# Patient Record
Sex: FEMALE | Race: OTHER | Employment: OTHER | URBAN - METROPOLITAN AREA
[De-identification: names, ages, dates, MRNs, and addresses within clinical notes are randomized per-mention and may not be internally consistent; named-entity substitution may affect disease eponyms.]

---

## 2020-05-30 ENCOUNTER — HOSPITAL ENCOUNTER (EMERGENCY)
Facility: HOSPITAL | Age: 80
Discharge: HOME | End: 2020-05-30
Attending: EMERGENCY MEDICINE
Payer: MEDICAID

## 2020-05-30 ENCOUNTER — APPOINTMENT (OUTPATIENT)
Dept: GENERAL RADIOLOGY | Facility: HOSPITAL | Age: 80
End: 2020-05-30
Attending: EMERGENCY MEDICINE
Payer: MEDICAID

## 2020-05-30 VITALS
SYSTOLIC BLOOD PRESSURE: 141 MMHG | RESPIRATION RATE: 24 BRPM | TEMPERATURE: 103 F | HEIGHT: 60 IN | DIASTOLIC BLOOD PRESSURE: 58 MMHG | WEIGHT: 170 LBS | OXYGEN SATURATION: 98 % | BODY MASS INDEX: 33.38 KG/M2 | HEART RATE: 65 BPM

## 2020-05-30 DIAGNOSIS — N30.00 ACUTE CYSTITIS WITHOUT HEMATURIA: Primary | ICD-10-CM

## 2020-05-30 DIAGNOSIS — R53.1 WEAKNESS: ICD-10-CM

## 2020-05-30 PROCEDURE — 96365 THER/PROPH/DIAG IV INF INIT: CPT

## 2020-05-30 PROCEDURE — 99285 EMERGENCY DEPT VISIT HI MDM: CPT

## 2020-05-30 PROCEDURE — 87040 BLOOD CULTURE FOR BACTERIA: CPT | Performed by: EMERGENCY MEDICINE

## 2020-05-30 PROCEDURE — 87086 URINE CULTURE/COLONY COUNT: CPT | Performed by: EMERGENCY MEDICINE

## 2020-05-30 PROCEDURE — 87077 CULTURE AEROBIC IDENTIFY: CPT | Performed by: EMERGENCY MEDICINE

## 2020-05-30 PROCEDURE — 87150 DNA/RNA AMPLIFIED PROBE: CPT | Performed by: EMERGENCY MEDICINE

## 2020-05-30 PROCEDURE — 87186 SC STD MICRODIL/AGAR DIL: CPT | Performed by: EMERGENCY MEDICINE

## 2020-05-30 PROCEDURE — 93005 ELECTROCARDIOGRAM TRACING: CPT

## 2020-05-30 PROCEDURE — 93010 ELECTROCARDIOGRAM REPORT: CPT

## 2020-05-30 PROCEDURE — 36415 COLL VENOUS BLD VENIPUNCTURE: CPT

## 2020-05-30 PROCEDURE — 83605 ASSAY OF LACTIC ACID: CPT | Performed by: EMERGENCY MEDICINE

## 2020-05-30 PROCEDURE — 85025 COMPLETE CBC W/AUTO DIFF WBC: CPT | Performed by: EMERGENCY MEDICINE

## 2020-05-30 PROCEDURE — 80053 COMPREHEN METABOLIC PANEL: CPT | Performed by: EMERGENCY MEDICINE

## 2020-05-30 PROCEDURE — 96361 HYDRATE IV INFUSION ADD-ON: CPT

## 2020-05-30 PROCEDURE — 81001 URINALYSIS AUTO W/SCOPE: CPT | Performed by: EMERGENCY MEDICINE

## 2020-05-30 PROCEDURE — 71045 X-RAY EXAM CHEST 1 VIEW: CPT | Performed by: EMERGENCY MEDICINE

## 2020-05-30 RX ORDER — ACETAMINOPHEN 500 MG
1000 TABLET ORAL ONCE
Status: COMPLETED | OUTPATIENT
Start: 2020-05-30 | End: 2020-05-30

## 2020-05-30 RX ORDER — SODIUM CHLORIDE 9 MG/ML
INJECTION, SOLUTION INTRAVENOUS CONTINUOUS
Status: DISCONTINUED | OUTPATIENT
Start: 2020-05-30 | End: 2020-05-31

## 2020-05-30 RX ORDER — AMOXICILLIN 500 MG/1
500 TABLET, FILM COATED ORAL 2 TIMES DAILY
Qty: 14 TABLET | Refills: 0 | Status: ON HOLD | OUTPATIENT
Start: 2020-05-30 | End: 2020-06-02

## 2020-05-30 NOTE — ED INITIAL ASSESSMENT (HPI)
Per medic patient having fever, dizzy, and weak for the last couple of days. Pt and family are Yakut speaking.  PMH: HTN, hypothyroid

## 2020-05-30 NOTE — ED PROVIDER NOTES
Patient Seen in: [facility] Emergency Department      History   Patient presents with:  Fever    Stated Complaint: fever    HPI    Patient is an 79-year-old female who presents via EMS for diffuse weakness, fever.   Patient primarily speaks [place] b normal.      Pupils: Pupils are equal, round, and reactive to light. Neck:      Musculoskeletal: Normal range of motion and neck supple. Cardiovascular:      Rate and Rhythm: Normal rate and regular rhythm. Heart sounds: Normal heart sounds.    Pul -----------         ------                     CBC W/ DIFFERENTIAL[[id number]]          Abnormal            Final result                 Please view results for these tests on the individual orders.    BLOOD CULTURE   BLOOD CULTURE   URI have any old labs for comparison although the daughter states the patient has some kidney problems. Her urine is consistent with UTI with some white cells, bacteria, leukocyte esterase. Rocephin has been ordered.   Also has been discussed with patient and

## 2020-05-31 ENCOUNTER — HOSPITAL ENCOUNTER (INPATIENT)
Facility: HOSPITAL | Age: 80
LOS: 5 days | Discharge: HOME | DRG: 690 | End: 2020-06-05
Attending: EMERGENCY MEDICINE | Admitting: HOSPITALIST
Payer: MEDICAID

## 2020-05-31 DIAGNOSIS — R78.81 E COLI BACTEREMIA: Primary | ICD-10-CM

## 2020-05-31 DIAGNOSIS — B96.20 E COLI BACTEREMIA: Primary | ICD-10-CM

## 2020-05-31 PROBLEM — D64.9 ANEMIA: Status: ACTIVE | Noted: 2020-05-31

## 2020-05-31 PROBLEM — R73.9 HYPERGLYCEMIA: Status: ACTIVE | Noted: 2020-05-31

## 2020-05-31 PROCEDURE — 99223 1ST HOSP IP/OBS HIGH 75: CPT | Performed by: HOSPITALIST

## 2020-05-31 RX ORDER — LATANOPROST 50 UG/ML
1 SOLUTION/ DROPS OPHTHALMIC NIGHTLY
COMMUNITY

## 2020-05-31 RX ORDER — LEVOTHYROXINE SODIUM 0.07 MG/1
75 TABLET ORAL
COMMUNITY

## 2020-05-31 RX ORDER — SPIRONOLACTONE 25 MG/1
25 TABLET ORAL DAILY
COMMUNITY

## 2020-05-31 RX ORDER — GABAPENTIN 100 MG/1
200 CAPSULE ORAL EVERY MORNING
Status: DISCONTINUED | OUTPATIENT
Start: 2020-06-01 | End: 2020-06-05

## 2020-05-31 RX ORDER — LEVOTHYROXINE SODIUM 0.07 MG/1
75 TABLET ORAL
Status: DISCONTINUED | OUTPATIENT
Start: 2020-06-01 | End: 2020-06-05

## 2020-05-31 RX ORDER — SODIUM CHLORIDE 9 MG/ML
INJECTION, SOLUTION INTRAVENOUS CONTINUOUS
Status: DISCONTINUED | OUTPATIENT
Start: 2020-05-31 | End: 2020-06-04

## 2020-05-31 RX ORDER — METOCLOPRAMIDE HYDROCHLORIDE 5 MG/ML
5 INJECTION INTRAMUSCULAR; INTRAVENOUS EVERY 8 HOURS PRN
Status: DISCONTINUED | OUTPATIENT
Start: 2020-05-31 | End: 2020-06-05

## 2020-05-31 RX ORDER — HEPARIN SODIUM 5000 [USP'U]/ML
5000 INJECTION, SOLUTION INTRAVENOUS; SUBCUTANEOUS EVERY 8 HOURS SCHEDULED
Status: DISCONTINUED | OUTPATIENT
Start: 2020-05-31 | End: 2020-06-05

## 2020-05-31 RX ORDER — LATANOPROST 50 UG/ML
1 SOLUTION/ DROPS OPHTHALMIC NIGHTLY
Status: DISCONTINUED | OUTPATIENT
Start: 2020-05-31 | End: 2020-06-05

## 2020-05-31 RX ORDER — GABAPENTIN 300 MG/1
300 CAPSULE ORAL EVERY EVENING
Status: DISCONTINUED | OUTPATIENT
Start: 2020-05-31 | End: 2020-06-05

## 2020-05-31 RX ORDER — HYDROCODONE BITARTRATE AND ACETAMINOPHEN 5; 325 MG/1; MG/1
1 TABLET ORAL 3 TIMES DAILY PRN
COMMUNITY

## 2020-05-31 RX ORDER — ATORVASTATIN CALCIUM 10 MG/1
10 TABLET, FILM COATED ORAL DAILY
COMMUNITY

## 2020-05-31 RX ORDER — ACETAMINOPHEN 325 MG/1
650 TABLET ORAL EVERY 6 HOURS PRN
Status: DISCONTINUED | OUTPATIENT
Start: 2020-05-31 | End: 2020-06-05

## 2020-05-31 RX ORDER — SERTRALINE HYDROCHLORIDE 25 MG/1
25 TABLET, FILM COATED ORAL NIGHTLY
Status: DISCONTINUED | OUTPATIENT
Start: 2020-06-01 | End: 2020-06-05

## 2020-05-31 RX ORDER — ONDANSETRON 2 MG/ML
4 INJECTION INTRAMUSCULAR; INTRAVENOUS EVERY 6 HOURS PRN
Status: DISCONTINUED | OUTPATIENT
Start: 2020-05-31 | End: 2020-06-05

## 2020-05-31 RX ORDER — FUROSEMIDE 40 MG/1
40 TABLET ORAL DAILY
Status: ON HOLD | COMMUNITY
End: 2020-06-02

## 2020-05-31 RX ORDER — ATORVASTATIN CALCIUM 10 MG/1
10 TABLET, FILM COATED ORAL DAILY
Status: DISCONTINUED | OUTPATIENT
Start: 2020-05-31 | End: 2020-06-05

## 2020-05-31 RX ORDER — METOPROLOL SUCCINATE 100 MG/1
100 TABLET, EXTENDED RELEASE ORAL DAILY
Status: DISCONTINUED | OUTPATIENT
Start: 2020-06-01 | End: 2020-06-05

## 2020-05-31 RX ORDER — METOPROLOL SUCCINATE 100 MG/1
100 TABLET, EXTENDED RELEASE ORAL DAILY
COMMUNITY

## 2020-05-31 RX ORDER — SODIUM CHLORIDE 9 MG/ML
INJECTION, SOLUTION INTRAVENOUS CONTINUOUS
Status: ACTIVE | OUTPATIENT
Start: 2020-05-31 | End: 2020-05-31

## 2020-05-31 RX ORDER — SERTRALINE HYDROCHLORIDE 25 MG/1
25 TABLET, FILM COATED ORAL NIGHTLY
COMMUNITY

## 2020-05-31 RX ORDER — GABAPENTIN 100 MG/1
200 CAPSULE ORAL EVERY MORNING
COMMUNITY

## 2020-05-31 RX ORDER — GABAPENTIN 100 MG/1
300 CAPSULE ORAL EVERY EVENING
COMMUNITY

## 2020-05-31 NOTE — ED PROVIDER NOTES
Patient Seen in: [facility] Emergency Department      History   Patient presents with:  Abnormal Result    Stated Complaint: + blood cultures    HPI    79year-old with history of hypertension, high cholesterol, hypothyroidism, chronic back pain pre rate and rhythm, normal S1-S2. Respiratory: Lungs are clear to auscultation bilaterally. Abdomen: Soft, nontender, nondistended. Back: No CVA tenderness. Extremities: No edema.   Skin: Warm and mildly diaphoretic  ED Course     Labs Reviewed   BASIC M HCT  35.0 - 48.0 % 35.9    PLT  150.0 - 450.0 10(3)uL 153.0    MCV  80.0 - 100.0 fL 97.6    MCH  26.0 - 34.0 pg 31.3    MCHC  31.0 - 37.0 g/dL 32.0    RDW  11.0 - 15.0 % 13.8    RDW-SD  35.1 - 46.3 fL 49.5High     Neutrophil Absolute Prelim  1.50 - 7.70 disposition: 5/31/2020 12:44 PM         Spoke with Dr. Jose Raul Curiel who will admit her.           Disposition and Plan     Clinical Impression:  E coli bacteremia  (primary encounter diagnosis)    Disposition:  Admit  5/31/2020 12:44 pm    Follow-up:  No follow-u

## 2020-05-31 NOTE — H&P
[facility]PITAL[facility]  History and Physical     Marianela Casarez Patient Status:  Emergency    3/26/1940 MRN [id number]   Location [address] Attending Almaz Cotton MD   [facility] Day # 0 PCP Rojelio Campbell DC, FNP-BC Metoprolol Succinate  MG Oral Tablet 24 Hr, Take 100 mg by mouth daily. , Disp: , Rfl:   spironolactone 25 MG Oral Tab, Take 25 mg by mouth daily. , Disp: , Rfl:   Sertraline HCl 25 MG Oral Tab, Take 25 mg by mouth daily. , Disp: , Rfl:   latanoprost 7.1  --        Estimated Creatinine Clearance: 13.3 mL/min (A) (based on SCr of 2.39 mg/dL (H)). No results for input(s): PTP, INR in the last 168 hours. No results for input(s): TROP, CK in the last 168 hours.     Imaging: Imaging data reviewed in Ep

## 2020-05-31 NOTE — PROGRESS NOTES
NURSING ADMISSION NOTE      Patient admitted via Cart  Oriented to room. Safety precautions initiated. Bed in low position. Call light in reach. Patient afebrile,  denies discomfort.

## 2020-06-01 ENCOUNTER — APPOINTMENT (OUTPATIENT)
Dept: ULTRASOUND IMAGING | Facility: HOSPITAL | Age: 80
DRG: 690 | End: 2020-06-01
Attending: HOSPITALIST
Payer: MEDICAID

## 2020-06-01 PROCEDURE — 76775 US EXAM ABDO BACK WALL LIM: CPT | Performed by: HOSPITALIST

## 2020-06-01 PROCEDURE — 99232 SBSQ HOSP IP/OBS MODERATE 35: CPT | Performed by: HOSPITALIST

## 2020-06-01 NOTE — PROGRESS NOTES
[facility] Pharmacy Note:  Renal Adjustment for piperacillin/tazobactam ([place])    Servando Cedeno is a 79year old female who has been prescribed piperacillin/tazobactam (ZOSYN) 3.375 g every 8 hrs.   CrCl is estimated creatinine clearance is 14.9 mL/min (A) (

## 2020-06-01 NOTE — PROGRESS NOTES
[facility]ITAL[facility]  Progress Note     Allynha Locus Patient Status:  Inpatient    3/26/1940 MRN [id number]   [facility] 4NW-A Attending Jesse Bell MD   [address] Day # 1 PCP Jena José DC, FNP-BC     Chief Complaint: Loral Bilis Epic.    Medications:   • piperacillin-tazobactam  3.375 g Intravenous Q12H   • Heparin Sodium (Porcine)  5,000 Units Subcutaneous Q8H [facility] & longterm   • gabapentin  200 mg Oral QAM   • gabapentin  300 mg Oral QPM   • atorvastatin  10 mg Oral Daily   • latanoprost  1

## 2020-06-01 NOTE — PLAN OF CARE
Assumed care of pt at [address]. Pt alert. Oriented to person only. Primarily Kenyan speaking. Vitals stable. No c/o pain. On IV fluids and IV abx. Pt pulled out IV. Multiple different RN attempts unsuccessful. ICU nurse going to attempt IV insertion.  Bed [facility] to maximize function  - Promote sitting position while performing ADLs such as feeding, grooming, and bathing  - Educate and encourage patient/family in tolerated functional activity level and precautions during self-care  Outcome: Progressing     Problem:

## 2020-06-01 NOTE — PLAN OF CARE
Pt is aox3 and [place] speaking only. Pt has denied any sob at this time, and any chest pain. Pt bowel sound are present, and pt denies any Nausea. Pt vitals has been stable and pt has been afebrile during this shift.  Pt has now questions at this time and Impaired Activities of Daily Living  Goal: Achieve highest/safest level of independence in self care  Description  Interventions:  - Assess ability and encourage patient to participate in ADLs to maximize function  - Promote sitting position while performi

## 2020-06-02 PROCEDURE — 99232 SBSQ HOSP IP/OBS MODERATE 35: CPT | Performed by: HOSPITALIST

## 2020-06-02 RX ORDER — CIPROFLOXACIN 500 MG/1
500 TABLET, FILM COATED ORAL DAILY
Qty: 10 TABLET | Refills: 0 | Status: SHIPPED | OUTPATIENT
Start: 2020-06-02 | End: 2020-06-05

## 2020-06-02 RX ORDER — CIPROFLOXACIN 500 MG/1
500 TABLET, FILM COATED ORAL 2 TIMES DAILY
Qty: 20 TABLET | Refills: 0 | Status: SHIPPED | OUTPATIENT
Start: 2020-06-02 | End: 2020-06-02

## 2020-06-02 RX ORDER — LOPERAMIDE HYDROCHLORIDE 2 MG/1
2 CAPSULE ORAL 4 TIMES DAILY PRN
Status: DISCONTINUED | OUTPATIENT
Start: 2020-06-02 | End: 2020-06-05

## 2020-06-02 RX ORDER — POTASSIUM CHLORIDE 14.9 MG/ML
20 INJECTION INTRAVENOUS ONCE
Status: COMPLETED | OUTPATIENT
Start: 2020-06-02 | End: 2020-06-02

## 2020-06-02 NOTE — PROGRESS NOTES
Pt is alert and oriented and speaks Mexican. Pt has temp of 100.1 and has ivf running.  Pt has no needs at this time

## 2020-06-02 NOTE — PAYOR COMM NOTE
--------------  ADMISSION REVIEW     Payor: Sal Mathew #:  [id number]  Authorization Number: [id number]    Admit date: 5/31/20  Admit time: 1518       Admitting Physician: Ewa Blackmon MD  Attending Physician:  Ewa Blackmon MD  Primary Care y HPI.  Constitutional and vital signs reviewed. All other systems reviewed and negative except as noted above.     Physical Exam     ED Triage Vitals [05/31/20 1147]   /48   Pulse 60   Resp 17   Temp 97.7 °F (36.5 °C)   Temp src Oral   SpO2 99 % 35High     Creatinine  0.55 - 1.02 mg/dL 2. 28High     BUN/CREA Ratio  10.0 - 20.0 15.4    Calcium, Total  8.5 - 10.1 mg/dL 8.1Low     Calculated Osmolality  275 - 295 mOsm/kg 292    GFR, Non-  >=60 19Low     GFR, -American  >=60 22Lo 3-5Abnormal     Bacteria Urine  None Seen 3+Abnormal     Squamous Epi.  Cells  Small /LPF None Seen    Renal Tubular Epithelial Cells  Small /LPF None Seen    Transitional Cells  Small /LPF None Seen    Hyaline Casts  None Seen /LPF PresentAbnormal     Muco fever.  Pt was seen in the ED yesterday and diagnosed with UTI. She felt better and wanted to go home.   She was d/c home in good condition, however, today she was notified her blood cultures were positive and asked to come back to be admitted for IV antib by mouth every evening., Disp: , Rfl:         Review of Systems:   A comprehensive 14 point review of systems was completed. Pertinent positives and negatives noted in the HPI.     Physical Exam:    /48   Pulse 59   Temp 97.7 °F (36.5 °C) (Oral) Gabapentin    Quality:  · DVT Prophylaxis: heparin  · CODE status: Full  · Whyte: no    Plan of care discussed with pt and RN.     Rickey Dakin, MD  5/31/2020                        Electronically signed by Ricky Aggarwal MD on 5/31/2020  3:59 PM         ME (ZOLOFT) tab 25 mg     Date Action Dose Route User    6/1/2020 2021 Given 25 mg Oral Sydnie Edge, RN          6/1 HOSPITALIST NOTE  Chief Complaint: bacteremia     S: Patient febrile overnight, denies abdominal pain, nausea, vomiting, back from ult Gabapentin  8.  CHEY on CKD, Cr improved     Plan of care: cont IV abx, f/u final sensitivities     Quality:  · DVT Prophylaxis: heparin  · CODE status: Full  · Whyte: no  · Central line: no     Will the patient be referred to TCC on discharge?: no  Estimate

## 2020-06-02 NOTE — PLAN OF CARE
Problem: METABOLIC/FLUID AND ELECTROLYTES - ADULT  Goal: Electrolytes maintained within normal limits  Description  INTERVENTIONS:  - Monitor labs and rhythm and assess patient for signs and symptoms of electrolyte imbalances  - Administer electrolyte re Does have hx of neuropathy. Receiving gabapentin as ordered. Pt. W/ temp 99 this late am. Did note to be somewhat diaphoretic prior to checking temp. Denies chills. Pt. Receiving Zosyn for Ecoli Bacteremia. Call received from pt's dtr Noemí Lee.  Update

## 2020-06-02 NOTE — PROGRESS NOTES
[facility]  Progress Note     Rob Gomez Patient Status:  Inpatient    3/26/1940 MRN [id number]   [facility] 4NW-A Attending Fernando Dove MD   [facility] Day # 2 PCP Yuliana Valiente DC, FNP-BC     Chief Complaint: Gabi Malcolm 168 hours. No results for input(s): TROP, CK in the last 168 hours. Imaging: Imaging data reviewed in Epic.     Medications:   • potassium chloride  20 mEq Intravenous Once   • piperacillin-tazobactam  3.375 g Intravenous Q12H   • Heparin Sodium

## 2020-06-03 PROCEDURE — 99232 SBSQ HOSP IP/OBS MODERATE 35: CPT | Performed by: HOSPITALIST

## 2020-06-03 RX ORDER — POTASSIUM CHLORIDE 20 MEQ/1
40 TABLET, EXTENDED RELEASE ORAL EVERY 4 HOURS
Status: COMPLETED | OUTPATIENT
Start: 2020-06-03 | End: 2020-06-03

## 2020-06-03 NOTE — PLAN OF CARE
Pt Aox4. [place] Islander speaking. RA. Pt had temp of 100.1 during shift. Tylenol given. Temp went down to 99. Pt having loose stools. Imodium given per MAR. IVF infusing. Fall precautions in place. Call light in reach. Will continue to monitor. care  Description  Interventions:  - Assess ability and encourage patient to participate in ADLs to maximize function  - Promote sitting position while performing ADLs such as feeding, grooming, and bathing  - Educate and encourage patient/family in [place]

## 2020-06-03 NOTE — PROGRESS NOTES
[facility] Pharmacy Note:  Renal Adjustment for Cefazolin     Indu Born is a 79year old female who has been prescribed Cefazolin 1000 mg every 8 hrs. CrCl is estimated creatinine clearance is 16.8 mL/min (A) (based on SCr of 1.89 mg/dL (H)).  so the [facility]

## 2020-06-03 NOTE — PLAN OF CARE
Patient alert and oriented x4. Tajik speaking.  line utilized. On Ra. Denies pain. IVF. IV abx. Afebrile. Diarrhea. cdiff negative. PRN immodium. Son updated on POC. Resting comfortably in bed. Will continue to monitor.     Dc'd IV

## 2020-06-03 NOTE — PROGRESS NOTES
[facility]  Progress Note     Mark Alex Patient Status:  Inpatient    3/26/1940 MRN [id number]   [facility] 4NW-A Attending Jim Adan MD   [facility] Day # 3 PCP Ronda Urrutia DC, FNP-BC     Chief Complaint: Valerie Hunt hours. No results for input(s): TROP, CK in the last 168 hours. Imaging: Imaging data reviewed in Epic.     Medications:   • Potassium Chloride ER  40 mEq Oral Q4H   • piperacillin-tazobactam  3.375 g Intravenous Q12H   • Heparin Sodium (Porcine)

## 2020-06-04 ENCOUNTER — APPOINTMENT (OUTPATIENT)
Dept: GENERAL RADIOLOGY | Facility: HOSPITAL | Age: 80
DRG: 690 | End: 2020-06-04
Attending: HOSPITALIST
Payer: MEDICAID

## 2020-06-04 PROCEDURE — 99233 SBSQ HOSP IP/OBS HIGH 50: CPT | Performed by: HOSPITALIST

## 2020-06-04 PROCEDURE — 71045 X-RAY EXAM CHEST 1 VIEW: CPT | Performed by: HOSPITALIST

## 2020-06-04 RX ORDER — FUROSEMIDE 10 MG/ML
20 INJECTION INTRAMUSCULAR; INTRAVENOUS ONCE
Status: COMPLETED | OUTPATIENT
Start: 2020-06-04 | End: 2020-06-04

## 2020-06-04 RX ORDER — IPRATROPIUM BROMIDE AND ALBUTEROL SULFATE 2.5; .5 MG/3ML; MG/3ML
3 SOLUTION RESPIRATORY (INHALATION) EVERY 6 HOURS PRN
Status: DISCONTINUED | OUTPATIENT
Start: 2020-06-04 | End: 2020-06-05

## 2020-06-04 NOTE — PROGRESS NOTES
[facility]  Progress Note     Salo Walton Patient Status:  Inpatient    3/26/1940 MRN [id number]   [facility] 4NW-A Attending Awais Plummer MD   [facility] Day # 4 PCP Rojelio Campbell DC, FNP-BC     Chief Complaint: abd pain --   --   --    TP 7.1  --   --   --   --   --   --     < > = values in this interval not displayed. Estimated Creatinine Clearance: 19.7 mL/min (A) (based on SCr of 1.61 mg/dL (H)). No results for input(s): PTP, INR in the last 168 hours.     No

## 2020-06-04 NOTE — PLAN OF CARE
Assumed care of pt at  Pt A/O x4. Vitals stable. Afebrile. No c/o pain. Pashto speaking- using  to communicate. IV fluids infusing per MAR. Ambulatory to bathroom. Bed alarm on. Call light within reach. Will continue to monitor.    0500: Pt sampson Progressing     Problem: Impaired Activities of Daily Living  Goal: Achieve highest/safest level of independence in self care  Description  Interventions:  - Assess ability and encourage patient to participate in ADLs to maximize function  - Promote sittin

## 2020-06-04 NOTE — PLAN OF CARE
Problem: Patient/Family Goals  Goal: Patient/Family Long Term Goal  Description  Patient's Long Term Goal: d/c with adequate resources     Interventions:  - SW/CM assistance   - See additional Care Plan goals for specific interventions   Outcome: [place] Monitor temperature, glucose, and sodium.  Initiate appropriate interventions as ordered  Outcome: Progressing     Problem: Impaired Activities of Daily Living  Goal: Achieve highest/safest level of independence in self care  Description  Interventions:  -

## 2020-06-05 VITALS
RESPIRATION RATE: 18 BRPM | SYSTOLIC BLOOD PRESSURE: 131 MMHG | DIASTOLIC BLOOD PRESSURE: 42 MMHG | HEIGHT: 60 IN | OXYGEN SATURATION: 96 % | HEART RATE: 55 BPM | WEIGHT: 169.31 LBS | BODY MASS INDEX: 33.24 KG/M2 | TEMPERATURE: 98 F

## 2020-06-05 PROCEDURE — 99239 HOSP IP/OBS DSCHRG MGMT >30: CPT | Performed by: HOSPITALIST

## 2020-06-05 RX ORDER — CIPROFLOXACIN 500 MG/1
500 TABLET, FILM COATED ORAL DAILY
Qty: 10 TABLET | Refills: 0 | Status: SHIPPED | OUTPATIENT
Start: 2020-06-05 | End: 2020-06-15

## 2020-06-05 NOTE — PLAN OF CARE
Awake & alert, afebrile & denies having any pain. Fever free since yesterday, appetite is good. Some wheezing & SOB on exertion otherwise she is on RA with O2 sats at 96. On antibiotics for UTI.  MD at bedside & she has just told patient that she can be disc highest/safest level of independence in self care  Description  Interventions:  - Assess ability and encourage patient to participate in ADLs to maximize function  - Promote sitting position while performing ADLs such as feeding, grooming, and bathing  - E

## 2020-06-05 NOTE — PROGRESS NOTES
Problem: Pt A&Ox4 . SS , this RN utilized  services. Afebrile. No c/o loose stools this shift. Up with standby assist. IV abx. IVF d/c'd . CXR today. IV lasix x1 .   Up with x1 assist w/walker Pt and family updated on plan of care and verbalized u

## 2020-06-05 NOTE — PLAN OF CARE
Assumed care of pt at [address] [place] [address]. Pt A/O x4- Korean speaking. Using  service. Pt denies pain or SOB. Afebrile. Ambulatory to bathroom- incontinent at times. Call light within reach. Will continue to monitor.      Problem: METABOLIC/FLUID AND ELECTROLYT ability and encourage patient to participate in ADLs to maximize function  - Promote sitting position while performing ADLs such as feeding, grooming, and bathing  - Educate and encourage patient/family in tolerated functional activity level and precaution

## 2020-06-05 NOTE — DISCHARGE SUMMARY
[facility] [place] HOSPITALIST  DISCHARGE SUMMARY     Suzan Casarez Patient Status:  Inpatient    3/26/1940 MRN [id number]   [facility] 4NW-A Attending Miguelina Barber MD   [facility] Day # 5 PCP Ronaldo Johnson, JOSE CARLOS-BC     Date of Admission:  HYDROcodone-acetaminophen 5-325 MG Tabs  Commonly known as:  NORCO      Take 1 tablet by mouth 3 (three) times daily as needed for Pain. Refills:  0     latanoprost 0.005 % Soln  Commonly known as:  XALATAN      Place 1 drop into both eyes nightly.    R 30 minutes

## 2020-06-06 NOTE — PAYOR COMM NOTE
--------------  DISCHARGE REVIEW    Payor: Kalpesh Mayen #:  [id number]  Authorization Number: [id number]    Admit date: 5/31/20  Admit time:  1518  Discharge Date: 6/5/2020  5:25 PM     Admitting Physician: Cammie Mckenna MD  Attending Physician:  Cheryl rich

## 2022-10-10 ENCOUNTER — APPOINTMENT (OUTPATIENT)
Dept: GENERAL RADIOLOGY | Facility: HOSPITAL | Age: 82
End: 2022-10-10
Payer: MEDICAID

## 2022-10-10 ENCOUNTER — HOSPITAL ENCOUNTER (EMERGENCY)
Facility: HOSPITAL | Age: 82
Discharge: HOME | End: 2022-10-10
Payer: MEDICAID

## 2022-10-10 VITALS
RESPIRATION RATE: 18 BRPM | DIASTOLIC BLOOD PRESSURE: 63 MMHG | HEART RATE: 64 BPM | SYSTOLIC BLOOD PRESSURE: 144 MMHG | TEMPERATURE: 97 F | OXYGEN SATURATION: 96 % | WEIGHT: 164 LBS | BODY MASS INDEX: 32 KG/M2

## 2022-10-10 DIAGNOSIS — S63.501A SPRAIN OF RIGHT WRIST, INITIAL ENCOUNTER: Primary | ICD-10-CM

## 2022-10-10 PROCEDURE — 99283 EMERGENCY DEPT VISIT LOW MDM: CPT

## 2022-10-10 PROCEDURE — 73090 X-RAY EXAM OF FOREARM: CPT

## 2022-10-10 PROCEDURE — 73130 X-RAY EXAM OF HAND: CPT

## 2022-10-11 NOTE — ED INITIAL ASSESSMENT (HPI)
Pt presents to ed c/o left wrist and hand pain at a 9/10 after falling on Friday, pt denies head or neck injury, denies Janice Jacinto.

## 2024-09-12 ENCOUNTER — HOSPITAL ENCOUNTER (INPATIENT)
Facility: HOSPITAL | Age: 84
LOS: 1 days | Discharge: HOME | End: 2024-09-13
Attending: EMERGENCY MEDICINE | Admitting: STUDENT IN AN ORGANIZED HEALTH CARE EDUCATION/TRAINING PROGRAM
Payer: MEDICAID

## 2024-09-12 ENCOUNTER — APPOINTMENT (OUTPATIENT)
Dept: GENERAL RADIOLOGY | Facility: HOSPITAL | Age: 84
End: 2024-09-12
Attending: EMERGENCY MEDICINE
Payer: MEDICAID

## 2024-09-12 ENCOUNTER — APPOINTMENT (OUTPATIENT)
Dept: CT IMAGING | Facility: HOSPITAL | Age: 84
End: 2024-09-12
Attending: EMERGENCY MEDICINE
Payer: MEDICAID

## 2024-09-12 ENCOUNTER — APPOINTMENT (OUTPATIENT)
Dept: MRI IMAGING | Facility: HOSPITAL | Age: 84
End: 2024-09-12
Attending: EMERGENCY MEDICINE
Payer: MEDICAID

## 2024-09-12 ENCOUNTER — TELEPHONE (OUTPATIENT)
Dept: NEUROLOGY | Facility: CLINIC | Age: 84
End: 2024-09-12

## 2024-09-12 DIAGNOSIS — G45.9 BRAIN TIA: Primary | ICD-10-CM

## 2024-09-12 DIAGNOSIS — R79.89 ELEVATED TROPONIN I LEVEL: ICD-10-CM

## 2024-09-12 PROBLEM — E78.5 HYPERLIPIDEMIA: Status: ACTIVE | Noted: 2024-09-12

## 2024-09-12 PROBLEM — E03.9 HYPOTHYROIDISM: Status: ACTIVE | Noted: 2024-09-12

## 2024-09-12 PROBLEM — I10 PRIMARY HYPERTENSION: Status: ACTIVE | Noted: 2024-09-12

## 2024-09-12 LAB
ALBUMIN SERPL-MCNC: 3.9 G/DL (ref 3.2–4.8)
ALBUMIN/GLOB SERPL: 1.3 {RATIO} (ref 1–2)
ALP LIVER SERPL-CCNC: 76 U/L
ALT SERPL-CCNC: 19 U/L
ANION GAP SERPL CALC-SCNC: 6 MMOL/L (ref 0–18)
APTT PPP: 33.1 SECONDS (ref 23–36)
AST SERPL-CCNC: 25 U/L (ref ?–34)
ATRIAL RATE: 56 BPM
BASOPHILS # BLD AUTO: 0.02 X10(3) UL (ref 0–0.2)
BASOPHILS NFR BLD AUTO: 0.4 %
BILIRUB SERPL-MCNC: 0.4 MG/DL (ref 0.2–1.1)
BUN BLD-MCNC: 33 MG/DL (ref 9–23)
CALCIUM BLD-MCNC: 9.3 MG/DL (ref 8.7–10.4)
CHLORIDE SERPL-SCNC: 109 MMOL/L (ref 98–112)
CHOLEST SERPL-MCNC: 132 MG/DL (ref ?–200)
CO2 SERPL-SCNC: 27 MMOL/L (ref 21–32)
CREAT BLD-MCNC: 1.9 MG/DL
EGFRCR SERPLBLD CKD-EPI 2021: 26 ML/MIN/1.73M2 (ref 60–?)
EOSINOPHIL # BLD AUTO: 0.24 X10(3) UL (ref 0–0.7)
EOSINOPHIL NFR BLD AUTO: 5 %
ERYTHROCYTE [DISTWIDTH] IN BLOOD BY AUTOMATED COUNT: 13.2 %
ERYTHROCYTE [SEDIMENTATION RATE] IN BLOOD: 24 MM/HR
EST. AVERAGE GLUCOSE BLD GHB EST-MCNC: 126 MG/DL (ref 68–126)
GLOBULIN PLAS-MCNC: 3 G/DL (ref 2–3.5)
GLUCOSE BLD-MCNC: 232 MG/DL (ref 70–99)
GLUCOSE BLD-MCNC: 78 MG/DL (ref 70–99)
GLUCOSE BLD-MCNC: 83 MG/DL (ref 70–99)
GLUCOSE BLD-MCNC: 87 MG/DL (ref 70–99)
HBA1C MFR BLD: 6 % (ref ?–5.7)
HCT VFR BLD AUTO: 36 %
HDLC SERPL-MCNC: 54 MG/DL (ref 40–59)
HGB BLD-MCNC: 11.8 G/DL
IMM GRANULOCYTES # BLD AUTO: 0.01 X10(3) UL (ref 0–1)
IMM GRANULOCYTES NFR BLD: 0.2 %
INR BLD: 1 (ref 0.8–1.2)
LDLC SERPL CALC-MCNC: 60 MG/DL (ref ?–100)
LYMPHOCYTES # BLD AUTO: 1.67 X10(3) UL (ref 1–4)
LYMPHOCYTES NFR BLD AUTO: 35.1 %
MCH RBC QN AUTO: 32.1 PG (ref 26–34)
MCHC RBC AUTO-ENTMCNC: 32.8 G/DL (ref 31–37)
MCV RBC AUTO: 97.8 FL
MONOCYTES # BLD AUTO: 0.41 X10(3) UL (ref 0.1–1)
MONOCYTES NFR BLD AUTO: 8.6 %
NEUTROPHILS # BLD AUTO: 2.41 X10 (3) UL (ref 1.5–7.7)
NEUTROPHILS # BLD AUTO: 2.41 X10(3) UL (ref 1.5–7.7)
NEUTROPHILS NFR BLD AUTO: 50.7 %
NONHDLC SERPL-MCNC: 78 MG/DL (ref ?–130)
OSMOLALITY SERPL CALC.SUM OF ELEC: 301 MOSM/KG (ref 275–295)
P AXIS: 32 DEGREES
P-R INTERVAL: 138 MS
PLATELET # BLD AUTO: 191 10(3)UL (ref 150–450)
POTASSIUM SERPL-SCNC: 4.5 MMOL/L (ref 3.5–5.1)
PROCALCITONIN SERPL-MCNC: 0.06 NG/ML (ref ?–0.05)
PROT SERPL-MCNC: 6.9 G/DL (ref 5.7–8.2)
PROTHROMBIN TIME: 13.2 SECONDS (ref 11.6–14.8)
Q-T INTERVAL: 430 MS
QRS DURATION: 76 MS
QTC CALCULATION (BEZET): 414 MS
R AXIS: 12 DEGREES
RBC # BLD AUTO: 3.68 X10(6)UL
SODIUM SERPL-SCNC: 142 MMOL/L (ref 136–145)
T AXIS: 16 DEGREES
TRIGL SERPL-MCNC: 93 MG/DL (ref 30–149)
TROPONIN I SERPL HS-MCNC: 59 NG/L
TROPONIN I SERPL HS-MCNC: 62 NG/L
VENTRICULAR RATE: 56 BPM
VLDLC SERPL CALC-MCNC: 14 MG/DL (ref 0–30)
WBC # BLD AUTO: 4.8 X10(3) UL (ref 4–11)

## 2024-09-12 PROCEDURE — 71045 X-RAY EXAM CHEST 1 VIEW: CPT | Performed by: EMERGENCY MEDICINE

## 2024-09-12 PROCEDURE — 99223 1ST HOSP IP/OBS HIGH 75: CPT | Performed by: STUDENT IN AN ORGANIZED HEALTH CARE EDUCATION/TRAINING PROGRAM

## 2024-09-12 PROCEDURE — 70450 CT HEAD/BRAIN W/O DYE: CPT | Performed by: EMERGENCY MEDICINE

## 2024-09-12 RX ORDER — METOCLOPRAMIDE HYDROCHLORIDE 5 MG/ML
5 INJECTION INTRAMUSCULAR; INTRAVENOUS EVERY 8 HOURS PRN
Status: DISCONTINUED | OUTPATIENT
Start: 2024-09-12 | End: 2024-09-12

## 2024-09-12 RX ORDER — ACETAMINOPHEN 500 MG
1000 TABLET ORAL EVERY 8 HOURS PRN
Status: DISCONTINUED | OUTPATIENT
Start: 2024-09-12 | End: 2024-09-13

## 2024-09-12 RX ORDER — ONDANSETRON 2 MG/ML
4 INJECTION INTRAMUSCULAR; INTRAVENOUS EVERY 6 HOURS PRN
Status: DISCONTINUED | OUTPATIENT
Start: 2024-09-12 | End: 2024-09-13

## 2024-09-12 RX ORDER — BISACODYL 10 MG
10 SUPPOSITORY, RECTAL RECTAL
Status: DISCONTINUED | OUTPATIENT
Start: 2024-09-12 | End: 2024-09-13

## 2024-09-12 RX ORDER — LOSARTAN POTASSIUM 25 MG/1
25 TABLET ORAL DAILY
COMMUNITY

## 2024-09-12 RX ORDER — DULOXETIN HYDROCHLORIDE 60 MG/1
60 CAPSULE, DELAYED RELEASE ORAL DAILY
COMMUNITY

## 2024-09-12 RX ORDER — ACETAMINOPHEN 325 MG/1
650 TABLET ORAL EVERY 4 HOURS PRN
Status: DISCONTINUED | OUTPATIENT
Start: 2024-09-12 | End: 2024-09-13

## 2024-09-12 RX ORDER — SENNOSIDES 8.6 MG
17.2 TABLET ORAL NIGHTLY PRN
Status: DISCONTINUED | OUTPATIENT
Start: 2024-09-12 | End: 2024-09-13

## 2024-09-12 RX ORDER — GABAPENTIN 300 MG/1
300 CAPSULE ORAL 3 TIMES DAILY
Status: DISCONTINUED | OUTPATIENT
Start: 2024-09-12 | End: 2024-09-13

## 2024-09-12 RX ORDER — ACETAMINOPHEN 650 MG/1
650 SUPPOSITORY RECTAL EVERY 4 HOURS PRN
Status: DISCONTINUED | OUTPATIENT
Start: 2024-09-12 | End: 2024-09-13

## 2024-09-12 RX ORDER — ENOXAPARIN SODIUM 100 MG/ML
30 INJECTION SUBCUTANEOUS DAILY
Status: DISCONTINUED | OUTPATIENT
Start: 2024-09-12 | End: 2024-09-13

## 2024-09-12 RX ORDER — SODIUM CHLORIDE 9 MG/ML
INJECTION, SOLUTION INTRAVENOUS CONTINUOUS
Status: DISCONTINUED | OUTPATIENT
Start: 2024-09-12 | End: 2024-09-13

## 2024-09-12 RX ORDER — BENZONATATE 100 MG/1
200 CAPSULE ORAL 3 TIMES DAILY PRN
Status: DISCONTINUED | OUTPATIENT
Start: 2024-09-12 | End: 2024-09-13

## 2024-09-12 RX ORDER — ONDANSETRON 2 MG/ML
4 INJECTION INTRAMUSCULAR; INTRAVENOUS EVERY 6 HOURS PRN
Status: DISCONTINUED | OUTPATIENT
Start: 2024-09-12 | End: 2024-09-12

## 2024-09-12 RX ORDER — LOSARTAN POTASSIUM 25 MG/1
25 TABLET ORAL DAILY
Status: DISCONTINUED | OUTPATIENT
Start: 2024-09-12 | End: 2024-09-12

## 2024-09-12 RX ORDER — ASPIRIN 81 MG/1
324 TABLET, CHEWABLE ORAL ONCE
Status: COMPLETED | OUTPATIENT
Start: 2024-09-12 | End: 2024-09-12

## 2024-09-12 RX ORDER — SODIUM CHLORIDE, SODIUM LACTATE, POTASSIUM CHLORIDE, CALCIUM CHLORIDE 600; 310; 30; 20 MG/100ML; MG/100ML; MG/100ML; MG/100ML
INJECTION, SOLUTION INTRAVENOUS CONTINUOUS
Status: DISCONTINUED | OUTPATIENT
Start: 2024-09-12 | End: 2024-09-12

## 2024-09-12 RX ORDER — HYDRALAZINE HYDROCHLORIDE 20 MG/ML
10 INJECTION INTRAMUSCULAR; INTRAVENOUS EVERY 2 HOUR PRN
Status: DISCONTINUED | OUTPATIENT
Start: 2024-09-12 | End: 2024-09-13

## 2024-09-12 RX ORDER — ONDANSETRON 2 MG/ML
4 INJECTION INTRAMUSCULAR; INTRAVENOUS EVERY 4 HOURS PRN
Status: DISCONTINUED | OUTPATIENT
Start: 2024-09-12 | End: 2024-09-12 | Stop reason: ALTCHOICE

## 2024-09-12 RX ORDER — METOCLOPRAMIDE HYDROCHLORIDE 5 MG/ML
5 INJECTION INTRAMUSCULAR; INTRAVENOUS EVERY 8 HOURS PRN
Status: DISCONTINUED | OUTPATIENT
Start: 2024-09-12 | End: 2024-09-13

## 2024-09-12 RX ORDER — ASPIRIN 300 MG/1
300 SUPPOSITORY RECTAL DAILY
Status: DISCONTINUED | OUTPATIENT
Start: 2024-09-13 | End: 2024-09-13

## 2024-09-12 RX ORDER — ONDANSETRON 2 MG/ML
4 INJECTION INTRAMUSCULAR; INTRAVENOUS ONCE
Status: COMPLETED | OUTPATIENT
Start: 2024-09-12 | End: 2024-09-12

## 2024-09-12 RX ORDER — ATORVASTATIN CALCIUM 40 MG/1
40 TABLET, FILM COATED ORAL NIGHTLY
Status: DISCONTINUED | OUTPATIENT
Start: 2024-09-12 | End: 2024-09-12

## 2024-09-12 RX ORDER — METOPROLOL SUCCINATE 50 MG/1
50 TABLET, EXTENDED RELEASE ORAL DAILY
Status: DISCONTINUED | OUTPATIENT
Start: 2024-09-12 | End: 2024-09-12

## 2024-09-12 RX ORDER — ECHINACEA PURPUREA EXTRACT 125 MG
1 TABLET ORAL
Status: DISCONTINUED | OUTPATIENT
Start: 2024-09-12 | End: 2024-09-13

## 2024-09-12 RX ORDER — TORSEMIDE 5 MG/1
10 TABLET ORAL DAILY
Status: DISCONTINUED | OUTPATIENT
Start: 2024-09-12 | End: 2024-09-12

## 2024-09-12 RX ORDER — TORSEMIDE 10 MG/1
10 TABLET ORAL DAILY
COMMUNITY

## 2024-09-12 RX ORDER — ASPIRIN 325 MG
325 TABLET ORAL DAILY
Status: DISCONTINUED | OUTPATIENT
Start: 2024-09-13 | End: 2024-09-13

## 2024-09-12 RX ORDER — METOPROLOL SUCCINATE 50 MG/1
50 TABLET, EXTENDED RELEASE ORAL DAILY
Status: DISCONTINUED | OUTPATIENT
Start: 2024-09-13 | End: 2024-09-13

## 2024-09-12 RX ORDER — DULOXETIN HYDROCHLORIDE 30 MG/1
60 CAPSULE, DELAYED RELEASE ORAL DAILY
Status: DISCONTINUED | OUTPATIENT
Start: 2024-09-12 | End: 2024-09-13

## 2024-09-12 RX ORDER — LEVOTHYROXINE SODIUM 100 UG/1
100 TABLET ORAL
Status: DISCONTINUED | OUTPATIENT
Start: 2024-09-13 | End: 2024-09-13

## 2024-09-12 RX ORDER — TORSEMIDE 5 MG/1
10 TABLET ORAL DAILY
Status: DISCONTINUED | OUTPATIENT
Start: 2024-09-13 | End: 2024-09-13

## 2024-09-12 RX ORDER — POLYETHYLENE GLYCOL 3350 17 G/17G
17 POWDER, FOR SOLUTION ORAL DAILY PRN
Status: DISCONTINUED | OUTPATIENT
Start: 2024-09-12 | End: 2024-09-13

## 2024-09-12 RX ORDER — LABETALOL HYDROCHLORIDE 5 MG/ML
10 INJECTION, SOLUTION INTRAVENOUS EVERY 10 MIN PRN
Status: DISCONTINUED | OUTPATIENT
Start: 2024-09-12 | End: 2024-09-13

## 2024-09-12 RX ORDER — LOSARTAN POTASSIUM 25 MG/1
25 TABLET ORAL DAILY
Status: DISCONTINUED | OUTPATIENT
Start: 2024-09-13 | End: 2024-09-13

## 2024-09-12 RX ORDER — ATORVASTATIN CALCIUM 20 MG/1
20 TABLET, FILM COATED ORAL DAILY
Status: DISCONTINUED | OUTPATIENT
Start: 2024-09-12 | End: 2024-09-13

## 2024-09-12 NOTE — ED QUICK NOTES
Bedside report given to receiving RN.  Rounding Completed    Plan of Care reviewed.   Bed is locked and in lowest position. Call light within reach.

## 2024-09-12 NOTE — ED QUICK NOTES
Now family reports the patient lost her vision last night from 7pm-8pm.  Now she has cloudy vision and left sided headache.

## 2024-09-12 NOTE — ED QUICK NOTES
on the phone at this time, patient reports Last night at 7pm her right eye was blurry x 3-4 hours.  Vision is still a little blurry  + dizziness for a few days, worse this morning, headache x 2 months.    Right now patient c/o headache on the right side of head, bilateral hand numbness, tingling, and pain.

## 2024-09-12 NOTE — ED INITIAL ASSESSMENT (HPI)
Patient reports she went to bed at 7pm yesterday and felt normal  Patient woke up at 8:30 and no vision to right eye x 1 hour, is now cloudy.  + headache x 1-2 weeks.

## 2024-09-12 NOTE — PROGRESS NOTES
Metoclopramide adjusted per P&T Protocol  CrCl cannot be calculated (Unknown ideal weight.).  Metoclopramide (Reglan)  Normal Dose Calculated CrCl > 10-60 mL/min Calculated CrCl < 10 mL/min   5mg PO or IV TID-QID or PRN 2.5mg PO or IV TID-QID or PRN 5 mg PO or IV daily or daily PRN   10mg PO or IV TID-QID or PRN 5mg PO or IV TID-QID or PRN 10 mg PO or IV daily or daily PRN   20mg PO or IV TID-QID or PRN 10mg PO or IV TID-QID or PRN 20 mg PO or IV daily or daily PRN

## 2024-09-12 NOTE — SLP NOTE
ADULT SWALLOWING EVALUATION    ASSESSMENT    ASSESSMENT/OVERALL IMPRESSION:  Patient is an 84 year old female with history of thyroid problems, high cholesterol. Admitted with vision loss to right eye. Orders were received for BSSE per stroke protocol. Patient is currently NPO. RN is fluent in Faroese and served as .   Patient reports 5 month history of difficulty swallowing as characterized by having to eat slower and with more caution coupled with sensation of fullness when eating.   Oral motor mechanism exam revealed functional oral range, rate, and strength of oral musculature, intact dentition, and clear vocal quality. Strong cough on command.  Assessed patient with thin liquids via spoon, and cup, puree, and solids.   Oral phase revealed functional oral acceptance, retrieval and mastication of solids with timely and complete clearing of the oral cavity.   Pharyngeal phase revealed an apparent timely initiation of the pharyngeal phase with functional hyolaryngeal elevation on palpation. No coughing or throat clearing. Patient presents with functional oral phase and apparent functional pharyngeal phase of swallow free of overt clinical s/s of aspiration.   Recommend regular solids and thin liquids with adherence to aspiration precautions of slow rate, small bites and sips, no straw drinking.   Will follow up to ensure safety with diet and educate pt on compensatory strategies/swallow precautions.   Awaiting MRI/neuro workup to determine need for cognitive/communicative assessment.       Roa Assessment of Swallow Function Score: No abnormality detected    RECOMMENDATIONS   Diet Recommendations - Solids: Regular  Diet Recommendations - Liquids: Thin Liquids        Compensatory Strategies Recommended: Small bites and sips;Slow rate  Aspiration Precautions: Upright position;Slow rate;Small bites and sips  Medication Administration Recommendations: One pill at a time  Treatment Plan/Recommendations:  Aspiration precautions (Meal monitor pending MRI, communicative assessment)    HISTORY   MEDICAL HISTORY  Reason for Referral: Stroke protocol    Problem List  Principal Problem:    Brain TIA  Active Problems:    Elevated troponin I level      Past Medical History  Past Medical History:    Back pain    Back problem    Depression    Disorder of thyroid    Essential hypertension    High blood pressure    High cholesterol    Hyperlipidemia    Incontinence    Osteoarthritis    Thyroid disease    Thyroid disease    hypothyroid       Prior Living Situation: Home with support  Diet Prior to Admission: Regular;Thin liquids  Precautions: None    Patient/Family Goals: To eat and drink    SWALLOWING HISTORY  Current Diet Consistency: NPO  Dysphagia History: No past history documented  Imaging Results:   CT of Brain:  IMPRESSION:   Sequelae of chronic small vessel ischemic disease is noted. No evidence of intracranial hemorrhage or extra-axial fluid collection.    SUBJECTIVE       OBJECTIVE   ORAL MOTOR EXAMINATION  Dentition: Lower dentures;Upper dentures  Symmetry: Within Functional Limits  Strength: Within Functional Limits  Tone: Within Functional Limits  Range of Motion: Within Functional Limits  Rate of Motion: Within Functional Limits    Voice Quality: Clear  Respiratory Status: Unlabored  Consistencies Trialed: Thin liquids;Puree;Hard solid  Method of Presentation: Self presentation;Spoon;Cup;Consecutive swallows  Patient Positioning: Upright;Midline (in bed)    Oral Phase of Swallow: Within Functional Limits                      Pharyngeal Phase of Swallow: Within Functional Limits           (Please note: Silent aspiration cannot be evaluated clinically. Videofluoroscopic Swallow Study is required to rule-out silent aspiration.)    Esophageal Phase of Swallow: Complaints consistent with possible esophageal involvement  Comments:               GOALS  Goal #1 The patient will tolerate regular consistency and thin liquids  without overt signs or symptoms of aspiration with 95 % accuracy over 1-2 session(s).  In Progress   Goal #2 The patient/family/caregiver will demonstrate understanding and implementation of aspiration precautions and swallow strategies independently over 1-2 session(s).    In Progress   Goal #3 Pending MRI and neuro workup, communication/cognition assessment will be performed.   Not Addressed     FOLLOW UP  Treatment Plan/Recommendations: Aspiration precautions (Meal monitor pending MRI, communicative assessment)  Number of Visits to Meet Established Goals: 2  Follow Up Needed (Documentation Required): Yes  SLP Follow-up Date: 09/13/24    Thank you for your referral.   If you have any questions, please contact ALAINA Bunch

## 2024-09-12 NOTE — PROGRESS NOTES
Stroke Alert initiated ED  Last Know Normal at Unclear likely 2-3 weeks ago- c/o of dizziness and headaches for 2-3 weeks but was worse this AM when she woke up. Between 7-8 pm last night she had difficulty seeing out of her right eye. This morning when she awoke she states her eye sight was blurry in her right eye but she could see better out of it than last night.   Pre-morbid MRS 1  Initial NIHSS 0 including   Patient accompanied to CT dept  Repeat NIHSS completed back in ED room    NIH Stroke Scale  1a.  Level of consciousness: 0   1b. LOC questions:  0   1c. LOC commands: 0   2.  Best Gaze: 0   3. Visual: 0   4. Facial Palsy: 0   5a. Motor left arm: 0   5b.  Motor right arm: 0   6a. 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  Motor right le   7. Limb Ataxia: 0   8.  Sensory: 0   9. Best Language:  0   10. Dysarthria: 0   11. Extinction and Inattention: 0     Total:   0      Other symptoms include dizziness and headaches for the last 2-3 weeks. C/O of right eye blurriness this AM.      IPAD utilized for assessment.      Per Dr Butterfield, patient is a candidate for TNK, exclusions include mild symptoms.    Patient and family educated on stroke diagnosis, treatment, plan of care, and what to expect during hospitalization; all pertinent questions and concerns addressed.     Stroke Alert timing affected by: utilizing  services to confirm last known well.     HX: Glaucoma, brain stimulator     Please refer to the Stroke Data Flowsheets for additional information and Stroke Alert response times.         Skin normal color for race, warm, dry and intact. No evidence of rash.

## 2024-09-12 NOTE — ED QUICK NOTES
Received report from RUSS Amado. Needs addressed with . Patient denies pain at this time. Plan is MRI / Admission.

## 2024-09-12 NOTE — TELEPHONE ENCOUNTER
TIA CLINIC SCREENING    1. Date of ED visit/TIA diagnosis:  09/12/2024   Time of discharge from ED:  1439    2. Is patient currently admitted?  Yes   If YES - TIA Clinic Appointment not required.

## 2024-09-12 NOTE — ED PROVIDER NOTES
Patient Seen in: [facility] Emergency Department      History     Chief Complaint   Patient presents with    Stroke     Stated Complaint: loss of vision x 1 hour last night, severe headache, dizziness    Subjective:   HPI    This is a 84-year-old female whose information is obtained through  the patient does have a history of thyroid problems high cholesterol, depression, high cholesterol.  The patient has had a headache for several weeks, 2 months.  She has also had some dizziness for several days.  The headache is on the right side of her head.  The information that she has noticed wrong.  I did obtain the information from the family 7 PM yesterday the patient had some vision loss of vision into her right eye.  Its gotten better over the last several hours after that at 7 PM its gotten better.  She still continues to have some mild blurred vision.  But symptoms started at 7 PM yesterday.  The patient has had the headache and dizziness for several weeks 2 days.  The patient has no new numbness or weakness, no fever.  No trauma.  She has a history of glaucoma and had eye surgery in 2017.    Objective:   Past Medical History:    Back pain    Back problem    Depression    Disorder of thyroid    Essential hypertension    High blood pressure    High cholesterol    Hyperlipidemia    Incontinence    Osteoarthritis    Thyroid disease    Thyroid disease    hypothyroid              Past Surgical History:   Procedure Laterality Date    Cataract Bilateral 2015    Excis lumbar disk,one level      Eye surgery  2017    glaucoma    Hip replacement surgery      Removal gallbladder      Spine surgery procedure unlisted  2010    Spine surgery procedure unlisted  02/2020    nerve stimulator    Thyroidectomy      Total hip replacement Left 2013                Social History     Socioeconomic History    Marital status:    Tobacco Use    Smoking status: Never    Smokeless tobacco: Never   Vaping Use     Vaping status: Never Used   Substance and Sexual Activity    Alcohol use: Never    Drug use: Never   Social History Narrative    ** Merged History Encounter **          Social Determinants of Health     Physical Activity: High Risk (8/21/2024)    Received from [facility]    Exercise Vital Sign     On average, how many days per week do you engage in moderate to strenuous exercise (like a brisk walk)?: 0 days     On average, how many minutes do you engage in exercise at this level?: 0 min              Review of Systems    Positive for stated Chief Complaint: Stroke    Other systems are as noted in HPI.  Constitutional and vital signs reviewed.      All other systems reviewed and negative except as noted above.    Physical Exam     ED Triage Vitals   BP 09/12/24 1005 157/64   Pulse 09/12/24 1005 62   Resp 09/12/24 1005 18   Temp 09/12/24 1009 97.6 °F (36.4 °C)   Temp src 09/12/24 1009 Temporal   SpO2 09/12/24 1005 97 %   O2 Device 09/12/24 1005 None (Room air)       Current Vitals:   Vital Signs  BP: 160/63  Pulse: 56  Resp: 14  Temp: 97.6 °F (36.4 °C)  Temp src: Temporal    Oxygen Therapy  SpO2: 100 %  O2 Device: None (Room air)            Physical Exam  The patient is in no respiratory distress.    The patient's neck is supple there is no meningismus.  Oral mucosa is wet lips do look wet.  Cranial nerves are grossly intact.    Extraocular muscles are intact    Pupils equal and reactive.    The neck is supple.    Lungs are clear with no wheezing or retractions    Cardiovascular is regular no murmurs.    Abdomen is soft nontender there is no masses no rebound    Extremities shows no edema no rash  Muscle strength is 5 out of 5 there is no pronator drift sensory exam seems to be grossly intact.  Finger-nose seems to be grossly intact.          ED Course     Labs Reviewed   COMP METABOLIC PANEL (14) - Abnormal; Notable for the following components:       Result Value    BUN 33 (*)     Creatinine 1.90 (*)      Calculated Osmolality 301 (*)     eGFR-Cr 26 (*)     All other components within normal limits   CBC WITH DIFFERENTIAL WITH PLATELET - Abnormal; Notable for the following components:    RBC 3.68 (*)     HGB 11.8 (*)     All other components within normal limits   TROPONIN I HIGH SENSITIVITY - Abnormal; Notable for the following components:    Troponin I (High Sensitivity) 62 (*)     All other components within normal limits   PROTHROMBIN TIME (PT) - Normal   PTT, ACTIVATED - Normal   LIPID PANEL - Normal   SED RATE, WESTERGREN (AUTOMATED) - Normal   POCT GLUCOSE - Normal   PROCALCITONIN   RAINBOW DRAW LAVENDER   RAINBOW DRAW LIGHT GREEN   RAINBOW DRAW BLUE   RAINBOW DRAW GOLD       TNK/ NI Documentation:    Date/Time last known well:   N/A 7 PM    NIHSS on presentation 0    Chief Complaint   Patient presents with    Stroke     IV Tenecteplase (TNK) administered: No; Patient is not a Candidate for IV TNK due to: Mild nondisabling symptoms or rapidly improving symptoms    Candidate for Endovascular thrombectomy (EVT): No; Patient is not a candidate for Endovascular Thrombectomy due to: Symptom onset > 24 hours      Disposition: There is no disposition on file for this visit.         IVs were started.  And then through the  phone the patient is telling a totally different story of dizziness for several days to weeks.  And then the vision changes were blurry at 7 PM yesterday.  And it lasted for mostly an hour but but it slowly got better over several hours.  She is outside the window for TNK.  Looking through her old records she has had a pretty severe kidney dysfunction.  And felt that her creatinine was significantly abnormal and her GFR was less than 29.  On her last workup.  Parker that it would be better for her not to cause any injury to her kidneys with IV contrast as she is outside the window for TN K.  And her symptoms are pretty much resolving at this present time she has some mild blurred vision but  no other new symptoms.  NIH score is low.  Will get a plain CT to rule out acute bleed.  She is outside the window for TNK will probably was possibly get an MRI/MRA         MDM      EKG shows sinus bradycardia rate of 56 there are some nonspecific ST changes QRS duration is 76 the rate is 56.  When compared to an old EKG from February 2016 there is no significant changes.  She had an old inferior abnormalities on the previous EKG.  This is not seen here.      Patient's troponin is 62 CBC shows a white count of 4.8 hemoglobin 11 platelet count  91 comprehensive shows findings of some renal insufficiency BUN 33 creatinine 1.90.  Glucose was 83 here.        I reviewed the radiology report and images by myself which showed  Chest x-ray shows cardiomegaly.       The CT of the brain showed no obvious bleed.  Official reading by the radiologist shows chest x-ray shows  mpression  CONCLUSION:    1. Linear opacity within the left mid lung may represent atelectasis and or scar, early consolidative process cannot be excluded, correlate clinically.         IMPRESSION:   Sequelae of chronic small vessel ischemic disease is noted. No evidence of intracranial hemorrhage or extra-axial fluid collection.   The patient was given aspirin the patient did not get a CTA because of the poor kidney function and a creatinine GFR of 26 and a creatinine of 1.90.    The patient's sed rate was not elevated.  The patient also had a comprehensive that shows some chronic renal insufficiency, troponin was 62.    I did do pressures in both eyes and the patient's pressures were less than 28 suggesting no glaucoma.    I did discuss his case with Dr. Leal the patient was given aspirin to protect her from a TIA, and elevated troponin this may be a nonspecific troponin because of her kidney function I do she has no chest pain the patient will be admitted.  I did discuss this case with Dr. Leal and I will discuss this case with the neurologist.  The  patient will attempt to get an MRI but but she is to get the stimulator and we will need to determine if there need MRI at this present time.    The patient has no new no complaints of cough, saturating at 100% I do not think there is a pneumonia and white counts not elevated will order procalcitonin.  The patient will be admitted.  I reexamined no focal findings on repeat examination.  The patient had no complaints of chest pain or shortness of breath on repeat exam.  Medical Decision Making      Disposition and Plan     Clinical Impression:  1. Brain TIA    2. Elevated troponin I level         Disposition:  Admit  9/12/2024 11:51 am    Follow-up:  No follow-up provider specified.        Medications Prescribed:  Current Discharge Medication List                            Hospital Problems       Present on Admission  Date Reviewed: 5/31/2020            ICD-10-CM Noted POA    * (Principal) Brain TIA G45.9 9/12/2024 Unknown

## 2024-09-12 NOTE — ED QUICK NOTES
Orders for admission, patient is aware of plan and ready to go upstairs. Any questions, please call ED RN Sterling at extension [phone].     Patient Covid vaccination status: Fully vaccinated     COVID Test Ordered in ED: None    COVID Suspicion at Admission: N/A    Running Infusions:  None    Mental Status/LOC at time of transport: A/Ox4    Other pertinent information: Serbian speaking. Need to verify neurostimulator serial number and model number.  CIWA score: N/A   NIH score:  0

## 2024-09-12 NOTE — PLAN OF CARE
Assumed pt care at 1500  A&Ox4, able to make needs known, Telugu speaking only  Pt came in to ED with c/o of vision loss to right eye and dizziness/headache for several days.  Pt family disclosed of patient having neuromodulator with details in chart review note   CT (-), waiting for MRI   No c/o N/V pain or SOB  Pt O2 WNL on RA  Ambulating to bathroom with SBA  Pt passed dysphagia test   Call light in reach, daughters at bedside  Bed in low position

## 2024-09-12 NOTE — H&P
[facility]     Judith Kahn Patient Status:  Emergency    3/26/1940 MRN [id number]   [facility] EMERGENCY DEPARTMENT Attending Yaya Izquierdo MD   Hosp Day # 0 PCP TOOTIE Stout     Chief Complaint: Right vision loss    History of Present Illness: Judith Kahn is a 84 year old female with PMHx Hypothyroidism, HLD, HTN, who presents for acute onset right vision loss.     Patient notes that yesterday afternoon she had sudden onset right-sided vision loss, notes that she felt like a curtain fell over her right eye, had blurriness in grayness of that right eye.  States that symptoms lasted for approximately 1 hour and then resolved spontaneously.  Denies any associated pain, headaches, temporal pain.  States that left eye had intact vision.  No recent head trauma, falls, injury.  No prior history of similar loss of vision.  No associated numbness, tingling, weakness of extremities.  Does have a history of glaucoma however well-managed.    Past Medical History:  Past Medical History:    Back pain    Back problem    Depression    Disorder of thyroid    Essential hypertension    High blood pressure    High cholesterol    Hyperlipidemia    Incontinence    Osteoarthritis    Thyroid disease    Thyroid disease    hypothyroid        Past Surgical History:   Past Surgical History:   Procedure Laterality Date    Cataract Bilateral 2015    Excis lumbar disk,one level      Eye surgery  2017    glaucoma    Hip replacement surgery      Removal gallbladder      Spine surgery procedure unlisted      Spine surgery procedure unlisted  2020    nerve stimulator    Thyroidectomy      Total hip replacement Left        Social History:  reports that she has never smoked. She has never used smokeless tobacco. She reports that she does not drink alcohol and does not use drugs.    Family History:   Family History   Problem Relation Age of Onset    Cancer Father      Diabetes Mother     Cancer Sister        Allergies: No Known Allergies    Medications:    No current facility-administered medications on file prior to encounter.     Current Outpatient Medications on File Prior to Encounter   Medication Sig Dispense Refill    DULoxetine 60 MG Oral Cap DR Particles Take 1 capsule (60 mg total) by mouth daily.      torsemide 10 MG Oral Tab Take 1 tablet (10 mg total) by mouth daily.      losartan 25 MG Oral Tab Take 1 tablet (25 mg total) by mouth daily.      Metoprolol Succinate  MG Oral Tablet 24 Hr Take 1 tablet (100 mg total) by mouth daily.      atorvastatin 10 MG Oral Tab Take 1 tablet (10 mg total) by mouth daily.      HYDROcodone-acetaminophen 5-325 MG Oral Tab Take 1 tablet by mouth 3 (three) times daily as needed for Pain.      gabapentin (NEURONTIN) 100 MG Oral Cap Take 3 capsules (300 mg total) by mouth 3 (three) times daily.      Levothyroxine Sodium (SYNTHROID, LEVOTHROID) 75 MCG Oral Tab Take 1 tablet (75 mcg total) by mouth before breakfast.      gabapentin 100 MG Oral Cap Take 2 capsules (200 mg total) by mouth every morning.      Levothyroxine Sodium 75 MCG Oral Tab Take 1 tablet (75 mcg total) by mouth before breakfast.      spironolactone 25 MG Oral Tab Take 1 tablet (25 mg total) by mouth daily.      Sertraline HCl 25 MG Oral Tab Take 1 tablet (25 mg total) by mouth nightly.      latanoprost 0.005 % Ophthalmic Solution Place 1 drop into both eyes nightly.      gabapentin 100 MG Oral Cap Take 3 capsules (300 mg total) by mouth every evening.      TraMADol HCl (ULTRAM) 50 MG Oral Tab Take 1 tablet (50 mg total) by mouth daily.      Omeprazole 40 MG Oral Capsule Delayed Release Take 1 capsule (40 mg total) by mouth daily.      Sertraline HCl (ZOLOFT) 25 MG Oral Tab Take 1 tablet (25 mg total) by mouth daily.      lisinopril (PRINIVIL,ZESTRIL) 20 MG Oral Tab Take 1 tablet (20 mg total) by mouth daily.      hydrochlorothiazide (HYDRODIURIL) 25 MG Oral Tab Take 1  tablet (25 mg total) by mouth daily.         Review of Systems:   A comprehensive 14 point review of systems was completed.    Pertinent positives and negatives noted in the HPI.    Physical Exam:    /50   Pulse 57   Temp 97.6 °F (36.4 °C) (Temporal)   Resp 16   SpO2 99%   General: No acute distress. Alert and oriented x 3.  HEENT: Normocephalic atraumatic. Moist mucous membranes. EOM-I. PERRLA. Anicteric.  Neck: No lymphadenopathy. No JVD. No carotid bruits.  Respiratory: Clear to auscultation bilaterally. No wheezes. No rhonchi.  Cardiovascular: S1, S2. Regular rate and rhythm. No murmurs, rubs or gallops. Equal pulses.   Chest and Back: No tenderness or deformity.  Abdomen: Soft, nontender, nondistended.  Positive bowel sounds. No rebound, guarding or organomegaly.  Neurologic: No focal neurological deficits. CNII-XII grossly intact.  Strength 5 out of 5 in bilateral upper and lower extremities  Musculoskeletal: Moves all extremities.  Extremities: No edema or cyanosis.  Integument: No rashes or lesions.   Psychiatric: Appropriate mood and affect.    Diagnostic Data:      Labs:  Recent Labs   Lab 09/12/24  1012   WBC 4.8   HGB 11.8*   MCV 97.8   .0   INR 1.00       Recent Labs   Lab 09/12/24  1012   GLU 87   BUN 33*   CREATSERUM 1.90*   CA 9.3   ALB 3.9      K 4.5      CO2 27.0   ALKPHO 76   AST 25   ALT 19   BILT 0.4   TP 6.9       CrCl cannot be calculated (Unknown ideal weight.).    Recent Labs   Lab 09/12/24  1012   PTP 13.2   INR 1.00       No results for input(s): \"TROP\", \"CK\" in the last 168 hours.    Imaging: Imaging data reviewed in [facility].  XR CHEST AP PORTABLE  (CPT=71045)    Result Date: 9/12/2024  CONCLUSION:  1. Linear opacity within the left mid lung may represent atelectasis and or scar, early consolidative process cannot be excluded, correlate clinically.   LOCATION:  [facility]      Dictated by (CST): Vani Bingham MD on 9/12/2024 at 11:10 AM     Finalized by (CST): Otilia  MD Vani on 9/12/2024 at 11:13 AM       CT STROKE BRAIN (NO IV)(CPT=70450)    Result Date: 9/12/2024  PROCEDURE:  CT STROKE BRAIN (NO IV)(CPT=70450)  COMPARISON:  None.  INDICATIONS:  loss of vision x 1 hour last night, severe headache, dizziness  TECHNIQUE:  Noncontrast CT scanning is performed through the brain.  Dose reduction techniques were used. Dose information is transmitted to the ACR (American College of Radiology) NRDR (National Radiology Data Registry) which includes the Dose Index Registry.  PATIENT STATED HISTORY: (As transcribed by Technologist)     FINDINGS: No evidence of intracranial hemorrhage or extra-axial fluid collection. Lucencies in the deep periventricular white matter are likely sequelae of chronic small vessel ischemic disease. Prominence of the sulci is noted. No mass effect. Visualized portions of paranasal sinuses are unremarkable. Visualized portions of the mastoid air cells are unremarkable. Visualized portions of the orbits are unremarkable. IMPRESSION: Sequelae of chronic small vessel ischemic disease is noted. No evidence of intracranial hemorrhage or extra-axial fluid collection.  Critical results were called to Dr. Izquierdo at 1036 hours on 9/12/2024.  There was appropriate read back.    LOCATION:  [facility]   Dictated by (CST): Joel Melendez MD on 9/12/2024 at 10:33 AM     Finalized by (CST): Joel Melendez MD on 9/12/2024 at 10:36 AM          ASSESSMENT / PLAN:     # Unilateral painless vision loss   - Now symptoms resolved    - Eye pressures within normal, sed rate not elevated, no history of migrainous headaches   - MRA head and neck, MRI brain pending   - Neurology on consult   - Will need f/u with primary ophthalmologist at discharge    # Hypertension - Continue home oral antihypertensives  # Hyperlipidemia - continue home statin   # Hypothyroidism - continue home levothyroxine       Code Status: Full Code    Plan of care discussed with patient, ED physician    Lawson  Angella Leal MD  9/12/2024      Supplementary Documentation:      MDM : Patient's ER labs, imaging reviewed.  A.m. labs ordered.  ER management discussed with ED physician, decision made for patient to be admitted to the hospital for further medical management.

## 2024-09-12 NOTE — ED QUICK NOTES
Spoke to [email or web address].     Model# [id number]. Located at T7 of spine.    Battery Model# [id number] Located in Left Flank     Notes:  Full body MRI compatible for 30 minutes of active scan time.    Patient has to place in \"MRI Mode\"    Battery serial# [id number]

## 2024-09-13 ENCOUNTER — APPOINTMENT (OUTPATIENT)
Dept: CV DIAGNOSTICS | Facility: HOSPITAL | Age: 84
End: 2024-09-13
Attending: Other
Payer: MEDICAID

## 2024-09-13 ENCOUNTER — APPOINTMENT (OUTPATIENT)
Dept: CT IMAGING | Facility: HOSPITAL | Age: 84
End: 2024-09-13
Attending: Other
Payer: MEDICAID

## 2024-09-13 VITALS
SYSTOLIC BLOOD PRESSURE: 127 MMHG | WEIGHT: 155 LBS | BODY MASS INDEX: 34.87 KG/M2 | TEMPERATURE: 98 F | HEIGHT: 56 IN | HEART RATE: 65 BPM | OXYGEN SATURATION: 98 % | DIASTOLIC BLOOD PRESSURE: 59 MMHG | RESPIRATION RATE: 22 BRPM

## 2024-09-13 PROBLEM — G45.9 TIA (TRANSIENT ISCHEMIC ATTACK): Status: ACTIVE | Noted: 2024-09-12

## 2024-09-13 LAB
ALBUMIN SERPL-MCNC: 3.4 G/DL (ref 3.2–4.8)
ALBUMIN/GLOB SERPL: 1.3 {RATIO} (ref 1–2)
ALP LIVER SERPL-CCNC: 67 U/L
ALT SERPL-CCNC: 17 U/L
ANION GAP SERPL CALC-SCNC: 6 MMOL/L (ref 0–18)
AST SERPL-CCNC: 25 U/L (ref ?–34)
BASOPHILS # BLD AUTO: 0.02 X10(3) UL (ref 0–0.2)
BASOPHILS NFR BLD AUTO: 0.4 %
BILIRUB SERPL-MCNC: 0.3 MG/DL (ref 0.2–1.1)
BUN BLD-MCNC: 26 MG/DL (ref 9–23)
CALCIUM BLD-MCNC: 8.9 MG/DL (ref 8.7–10.4)
CHLORIDE SERPL-SCNC: 110 MMOL/L (ref 98–112)
CO2 SERPL-SCNC: 25 MMOL/L (ref 21–32)
CREAT BLD-MCNC: 1.48 MG/DL
EGFRCR SERPLBLD CKD-EPI 2021: 34 ML/MIN/1.73M2 (ref 60–?)
EOSINOPHIL # BLD AUTO: 0.18 X10(3) UL (ref 0–0.7)
EOSINOPHIL NFR BLD AUTO: 4 %
ERYTHROCYTE [DISTWIDTH] IN BLOOD BY AUTOMATED COUNT: 13.2 %
GLOBULIN PLAS-MCNC: 2.6 G/DL (ref 2–3.5)
GLUCOSE BLD-MCNC: 128 MG/DL (ref 70–99)
GLUCOSE BLD-MCNC: 78 MG/DL (ref 70–99)
GLUCOSE BLD-MCNC: 86 MG/DL (ref 70–99)
HCT VFR BLD AUTO: 33.2 %
HGB BLD-MCNC: 10.9 G/DL
IMM GRANULOCYTES # BLD AUTO: 0.01 X10(3) UL (ref 0–1)
IMM GRANULOCYTES NFR BLD: 0.2 %
INR BLD: 1.04 (ref 0.8–1.2)
LYMPHOCYTES # BLD AUTO: 1.45 X10(3) UL (ref 1–4)
LYMPHOCYTES NFR BLD AUTO: 32.3 %
MAGNESIUM SERPL-MCNC: 2.1 MG/DL (ref 1.6–2.6)
MCH RBC QN AUTO: 31.8 PG (ref 26–34)
MCHC RBC AUTO-ENTMCNC: 32.8 G/DL (ref 31–37)
MCV RBC AUTO: 96.8 FL
MONOCYTES # BLD AUTO: 0.44 X10(3) UL (ref 0.1–1)
MONOCYTES NFR BLD AUTO: 9.8 %
NEUTROPHILS # BLD AUTO: 2.39 X10 (3) UL (ref 1.5–7.7)
NEUTROPHILS # BLD AUTO: 2.39 X10(3) UL (ref 1.5–7.7)
NEUTROPHILS NFR BLD AUTO: 53.3 %
OSMOLALITY SERPL CALC.SUM OF ELEC: 296 MOSM/KG (ref 275–295)
PHOSPHATE SERPL-MCNC: 4.1 MG/DL (ref 2.4–5.1)
PLATELET # BLD AUTO: 163 10(3)UL (ref 150–450)
POTASSIUM SERPL-SCNC: 4.4 MMOL/L (ref 3.5–5.1)
PROT SERPL-MCNC: 6 G/DL (ref 5.7–8.2)
PROTHROMBIN TIME: 13.6 SECONDS (ref 11.6–14.8)
RBC # BLD AUTO: 3.43 X10(6)UL
SODIUM SERPL-SCNC: 141 MMOL/L (ref 136–145)
TROPONIN I SERPL HS-MCNC: 63 NG/L
WBC # BLD AUTO: 4.5 X10(3) UL (ref 4–11)

## 2024-09-13 PROCEDURE — 99223 1ST HOSP IP/OBS HIGH 75: CPT | Performed by: OTHER

## 2024-09-13 PROCEDURE — 93306 TTE W/DOPPLER COMPLETE: CPT | Performed by: OTHER

## 2024-09-13 PROCEDURE — 70496 CT ANGIOGRAPHY HEAD: CPT | Performed by: OTHER

## 2024-09-13 PROCEDURE — 70498 CT ANGIOGRAPHY NECK: CPT | Performed by: OTHER

## 2024-09-13 PROCEDURE — 99239 HOSP IP/OBS DSCHRG MGMT >30: CPT | Performed by: STUDENT IN AN ORGANIZED HEALTH CARE EDUCATION/TRAINING PROGRAM

## 2024-09-13 PROCEDURE — 99232 SBSQ HOSP IP/OBS MODERATE 35: CPT | Performed by: STUDENT IN AN ORGANIZED HEALTH CARE EDUCATION/TRAINING PROGRAM

## 2024-09-13 RX ORDER — CLOPIDOGREL BISULFATE 75 MG/1
75 TABLET ORAL DAILY
Qty: 30 TABLET | Refills: 2 | Status: SHIPPED | OUTPATIENT
Start: 2024-09-13

## 2024-09-13 RX ORDER — ASPIRIN 81 MG/1
81 TABLET ORAL DAILY
Qty: 30 TABLET | Refills: 2 | Status: SHIPPED | OUTPATIENT
Start: 2024-09-14

## 2024-09-13 RX ORDER — ASPIRIN 81 MG/1
81 TABLET ORAL DAILY
Status: DISCONTINUED | OUTPATIENT
Start: 2024-09-14 | End: 2024-09-13

## 2024-09-13 RX ORDER — CLOPIDOGREL BISULFATE 75 MG/1
75 TABLET ORAL DAILY
Status: DISCONTINUED | OUTPATIENT
Start: 2024-09-13 | End: 2024-09-13

## 2024-09-13 NOTE — PLAN OF CARE
Stroke booklet given to patient; the following education was provided:     What is stroke  BEFAST - Stroke warning sings and symptoms  How to initiate EMS  Secondary stroke prevention and personalized risk factors: HLD  Lifestyle modifications (nutrition and exercise)  Post-stroke recovery and follow-up  Community resources (stroke support group and non-emergent stroke line)    Patient and daughter present during education, patient and daughter receptive to teachings. All pertinent questions and concerns were addressed.    Patient has chosen Bri as designated care partner. Bri can be reached at [phone].      RN Stroke Navigator team  [phone]

## 2024-09-13 NOTE — PHYSICAL THERAPY NOTE
PHYSICAL THERAPY EVALUATION - INPATIENT     Room Number: 7620/7620-A  Evaluation Date: 2024  Type of Evaluation: Initial  Physician Order: PT Eval and Treat    Presenting Problem: R eye vision loss  Co-Morbidities : anemia, DM, HTN  Reason for Therapy: Mobility Dysfunction and Discharge Planning    PHYSICAL THERAPY ASSESSMENT   Patient is a 84 year old female admitted 2024 for right eye vision loss, resolved at time of this evaluation.   Patient is currently functioning at baseline with bed mobility, transfers, gait, and stair negotiation. Prior to admission, patient's baseline is mod I with occasinoal use of rollator walker.     Given the patient is functioning near baseline level do not anticipate skilled therapy needs at discharge .    PLAN  Patient has been evaluated and presents with no skilled Physical Therapy needs at this time.  Patient discharged from Physical Therapy services.  Please re-order if a new functional limitation presents during this admission.    GOALS  Patient was able to achieve the following goals ...    Patient was able to transfer At previous, functional level   Patient able to ambulate on level surfaces At previous, functional level         HOME SITUATION  Type of Home: House   Home Layout: Two level  Stairs to Enter : 2     Stairs to Bedroom: 14  Railing: Yes    Lives With: Daughter;Family  Drives: No  Patient Owned Equipment: Rollator  Patient Regularly Uses: Reading glasses    Prior Level of [place]: Pt typically indep with ADLs and mobility, has a rollator to use if needed. Lives with family.     SUBJECTIVE  \"My vision is better today\"       OBJECTIVE  Precautions: Bed/chair alarm  Fall Risk: Standard fall risk    WEIGHT BEARING RESTRICTION  Weight Bearing Restriction: None                PAIN ASSESSMENT  Ratin          COGNITION  Overall Cognitive Status:  WFL - within functional limits    RANGE OF MOTION AND STRENGTH ASSESSMENT  See OT note for UE  assessment    Lower extremity ROM is within functional limits     Lower extremity strength is within functional limits     BALANCE  Static Sitting: Good  Dynamic Sitting: Good  Static Standing: Fair +  Dynamic Standing: Fair    ADDITIONAL TESTS                                    ACTIVITY TOLERANCE                         O2 WALK       NEUROLOGICAL FINDINGS  Neurological Findings: Coordination - Heel to Shin;Coordination - Rapid Alternating Movement     Coordination - Heel to Shin: Symmetrical  Coordination - Rapid Alternating Movement: Symmetrical            AM-PAC '6-Clicks' INPATIENT SHORT FORM - BASIC MOBILITY  How much difficulty does the patient currently have...  Patient Difficulty: Turning over in bed (including adjusting bedclothes, sheets and blankets)?: None   Patient Difficulty: Sitting down on and standing up from a chair with arms (e.g., wheelchair, bedside commode, etc.): None   Patient Difficulty: Moving from lying on back to sitting on the side of the bed?: None   How much help from another person does the patient currently need...   Help from Another: Moving to and from a bed to a chair (including a wheelchair)?: A Little   Help from Another: Need to walk in hospital room?: A Little   Help from Another: Climbing 3-5 steps with a railing?: A Little       AM-PAC Score:  Raw Score: 21   Approx Degree of Impairment: 28.97%   Standardized Score (AM-PAC Scale): 50.25   CMS Modifier (G-Code): CJ    FUNCTIONAL ABILITY STATUS  Gait Assessment   Functional Mobility/Gait Assessment  Gait Assistance: Supervision  Distance (ft): 150  Assistive Device: Rolling walker;None  Pattern:  (dec luis carlos and gait speed)  Stairs: Stoop/curb  Stoop/Curb: x1, supervision, railing    Skilled Therapy Provided     Bed Mobility:  Rolling: NT  Supine to sit: ind   Sit to supine: NT     Transfer Mobility:  Sit to stand: ind   Stand to sit: ind  Gait = supervision    Therapist's comments:RN cleared for session. Pt agreeable for  therapy, received supine. Pt encouraged continued use of RW for optimal balance and stability. Pt demo good awareness of surroundings in the hallway. Instructed to call for nursing staff for any needs and OOB mobility.     Exercise/Education Provided:  Bed mobility  Body mechanics  Energy conservation  Functional activity tolerated  Gait training  Posture  Strengthening  Transfer training    Patient End of Session: Up in chair;Needs met;Call light within reach;RN aware of session/findings;All patient questions and concerns addressed;Alarm set;Family present    Patient Evaluation Complexity Level:  History High - 3 or more personal factors and/or co-morbidities   Examination of body systems Low -  addressing 1-2 elements   Clinical Presentation Low- Stable   Clinical Decision Making Low Complexity       PT Session Time: 25 minutes  Gait Training: 10 minutes  Therapeutic Activity: 5 minutes

## 2024-09-13 NOTE — PAYOR COMM NOTE
--------------  ADMISSION REVIEW     Payor: [facility]  Subscriber #:  [id number]  Authorization Number: [id number]    Admit date: 9/12/24  Admit time:  2:39 PM       REVIEW DOCUMENTATION:     ED Provider Notes        ED Provider Notes signed by Yaya Izquierdo MD at 9/12/2024 11:55 AM       Author: Yaya Izquierdo MD Service: -- Author Type: Physician    Filed: 9/12/2024 11:55 AM Date of Service: 9/12/2024 10:19 AM Status: Signed    : Yaya Izquierdo MD (Physician)           Patient Seen in: [facility] Emergency Department      History     Chief Complaint   Patient presents with    Stroke     Stated Complaint: loss of vision x 1 hour last night, severe headache, dizziness    Subjective:   HPI    This is a 84-year-old female whose information is obtained through  the patient does have a history of thyroid problems high cholesterol, depression, high cholesterol.  The patient has had a headache for several weeks, 2 months.  She has also had some dizziness for several days.  The headache is on the right side of her head.  The information that she has noticed wrong.  I did obtain the information from the family 7 PM yesterday the patient had some vision loss of vision into her right eye.  Its gotten better over the last several hours after that at 7 PM its gotten better.  She still continues to have some mild blurred vision.  But symptoms started at 7 PM yesterday.  The patient has had the headache and dizziness for several weeks 2 days.  The patient has no new numbness or weakness, no fever.  No trauma.  She has a history of glaucoma and had eye surgery in 2017.    Objective:   Past Medical History:    Back pain    Back problem    Depression    Disorder of thyroid    Essential hypertension    High blood pressure    High cholesterol    Hyperlipidemia    Incontinence    Osteoarthritis    Thyroid disease    Thyroid disease    hypothyroid              Past  Surgical History:   Procedure Laterality Date    Cataract Bilateral 2015    Excis lumbar disk,one level      Eye surgery  2017    glaucoma    Hip replacement surgery      Removal gallbladder      Spine surgery procedure unlisted  2010    Spine surgery procedure unlisted  02/2020    nerve stimulator    Thyroidectomy      Total hip replacement Left 2013                Social History     Socioeconomic History    Marital status:    Tobacco Use    Smoking status: Never    Smokeless tobacco: Never   Vaping Use    Vaping status: Never Used   Substance and Sexual Activity    Alcohol use: Never    Drug use: Never   Social History Narrative    ** Merged History Encounter **          Social Determinants of Health     Physical Activity: High Risk (8/21/2024)    Received from [facility]    Exercise Vital Sign     On average, how many days per week do you engage in moderate to strenuous exercise (like a brisk walk)?: 0 days     On average, how many minutes do you engage in exercise at this level?: 0 min              Review of Systems    Positive for stated Chief Complaint: Stroke    Other systems are as noted in HPI.  Constitutional and vital signs reviewed.      All other systems reviewed and negative except as noted above.    Physical Exam     ED Triage Vitals   BP 09/12/24 1005 157/64   Pulse 09/12/24 1005 62   Resp 09/12/24 1005 18   Temp 09/12/24 1009 97.6 °F (36.4 °C)   Temp src 09/12/24 1009 Temporal   SpO2 09/12/24 1005 97 %   O2 Device 09/12/24 1005 None (Room air)       Current Vitals:   Vital Signs  BP: 160/63  Pulse: 56  Resp: 14  Temp: 97.6 °F (36.4 °C)  Temp src: Temporal    Oxygen Therapy  SpO2: 100 %  O2 Device: None (Room air)            Physical Exam  The patient is in no respiratory distress.    The patient's neck is supple there is no meningismus.  Oral mucosa is wet lips do look wet.  Cranial nerves are grossly intact.    Extraocular muscles are intact    Pupils equal and reactive.    The  neck is supple.    Lungs are clear with no wheezing or retractions    Cardiovascular is regular no murmurs.    Abdomen is soft nontender there is no masses no rebound    Extremities shows no edema no rash  Muscle strength is 5 out of 5 there is no pronator drift sensory exam seems to be grossly intact.  Finger-nose seems to be grossly intact.          ED Course     Labs Reviewed   COMP METABOLIC PANEL (14) - Abnormal; Notable for the following components:       Result Value    BUN 33 (*)     Creatinine 1.90 (*)     Calculated Osmolality 301 (*)     eGFR-Cr 26 (*)     All other components within normal limits   CBC WITH DIFFERENTIAL WITH PLATELET - Abnormal; Notable for the following components:    RBC 3.68 (*)     HGB 11.8 (*)     All other components within normal limits   TROPONIN I HIGH SENSITIVITY - Abnormal; Notable for the following components:    Troponin I (High Sensitivity) 62 (*)     All other components within normal limits   PROTHROMBIN TIME (PT) - Normal   PTT, ACTIVATED - Normal   LIPID PANEL - Normal   SED RATE, WESTERGREN (AUTOMATED) - Normal   POCT GLUCOSE - Normal   PROCALCITONIN   RAINBOW DRAW LAVENDER   RAINBOW DRAW LIGHT GREEN   RAINBOW DRAW BLUE   RAINBOW DRAW GOLD       TNK/ NI Documentation:    Date/Time last known well:   N/A 7 PM    NIHSS on presentation 0    Chief Complaint   Patient presents with    Stroke     IV Tenecteplase (TNK) administered: No; Patient is not a Candidate for IV TNK due to: Mild nondisabling symptoms or rapidly improving symptoms    Candidate for Endovascular thrombectomy (EVT): No; Patient is not a candidate for Endovascular Thrombectomy due to: Symptom onset > 24 hours      Disposition: There is no disposition on file for this visit.         IVs were started.  And then through the  phone the patient is telling a totally different story of dizziness for several days to weeks.  And then the vision changes were blurry at 7 PM yesterday.  And it lasted for  mostly an hour but but it slowly got better over several hours.  She is outside the window for TNK.  Looking through her old records she has had a pretty severe kidney dysfunction.  And felt that her creatinine was significantly abnormal and her GFR was less than 29.  On her last workup.  Juana Diaz that it would be better for her not to cause any injury to her kidneys with IV contrast as she is outside the window for TN K.  And her symptoms are pretty much resolving at this present time she has some mild blurred vision but no other new symptoms.  NIH score is low.  Will get a plain CT to rule out acute bleed.  She is outside the window for TNK will probably was possibly get an MRI/MRA        MDM      EKG shows sinus bradycardia rate of 56 there are some nonspecific ST changes QRS duration is 76 the rate is 56.  When compared to an old EKG from February 2016 there is no significant changes.  She had an old inferior abnormalities on the previous EKG.  This is not seen here.      Patient's troponin is 62 CBC shows a white count of 4.8 hemoglobin 11 platelet count  91 comprehensive shows findings of some renal insufficiency BUN 33 creatinine 1.90.  Glucose was 83 here.        I reviewed the radiology report and images by myself which showed  Chest x-ray shows cardiomegaly.       The CT of the brain showed no obvious bleed.  Official reading by the radiologist shows chest x-ray shows  mpression  CONCLUSION:    1. Linear opacity within the left mid lung may represent atelectasis and or scar, early consolidative process cannot be excluded, correlate clinically.         IMPRESSION:   Sequelae of chronic small vessel ischemic disease is noted. No evidence of intracranial hemorrhage or extra-axial fluid collection.   The patient was given aspirin the patient did not get a CTA because of the poor kidney function and a creatinine GFR of 26 and a creatinine of 1.90.    The patient's sed rate was not elevated.  The patient also had a  comprehensive that shows some chronic renal insufficiency, troponin was 62.    I did do pressures in both eyes and the patient's pressures were less than 28 suggesting no glaucoma.    I did discuss his case with Dr. Leal the patient was given aspirin to protect her from a TIA, and elevated troponin this may be a nonspecific troponin because of her kidney function I do she has no chest pain the patient will be admitted.  I did discuss this case with Dr. Leal and I will discuss this case with the neurologist.  The patient will attempt to get an MRI but but she is to get the stimulator and we will need to determine if there need MRI at this present time.    The patient has no new no complaints of cough, saturating at 100% I do not think there is a pneumonia and white counts not elevated will order procalcitonin.  The patient will be admitted.  I reexamined no focal findings on repeat examination.  The patient had no complaints of chest pain or shortness of breath on repeat exam.  Medical Decision Making      Disposition and Plan     Clinical Impression:  1. Brain TIA    2. Elevated troponin I level         Disposition:  Admit  9/12/2024 11:51 am    Follow-up:  No follow-up provider specified.        Medications Prescribed:  Current Discharge Medication List                            Hospital Problems       Present on Admission  Date Reviewed: 5/31/2020            ICD-10-CM Noted POA    * (Principal) Brain TIA G45.9 9/12/2024 Unknown                     Signed by Yaya Izquierdo MD on 9/12/2024 11:55 AM         MEDICATIONS ADMINISTERED IN LAST 1 DAY:  aspirin tab 325 mg       Date Action Dose Route User    9/13/2024 1225 Given 325 mg Oral Mira Burton RN          atorvastatin (Lipitor) tab 20 mg       Date Action Dose Route User    9/13/2024 1225 Given 20 mg Oral Mira Burton RN    9/12/2024 1811 Given 20 mg Oral Serena Arreola RN          DULoxetine (Cymbalta) DR cap 60 mg       Date Action Dose Route  User    9/13/2024 1225 Given 60 mg Oral Mira Burton RN    9/12/2024 1811 Given 60 mg Oral Serena Arreola RN          enoxaparin (Lovenox) 30 MG/0.3ML SUBQ injection 30 mg       Date Action Dose Route User    9/12/2024 1811 Given 30 mg Subcutaneous (Left Upper Abdomen) Serena Arreola RN          gabapentin (Neurontin) cap 300 mg       Date Action Dose Route User    9/13/2024 1225 Given 300 mg Oral Mira Burton RN    9/12/2024 2037 Given 300 mg Oral Radha Benson RN    9/12/2024 1811 Given 300 mg Oral Serena Arreola RN          levothyroxine (Synthroid) tab 100 mcg       Date Action Dose Route User    9/13/2024 1225 Given 100 mcg Oral Mira Burton RN          losartan (Cozaar) tab 25 mg       Date Action Dose Route User    9/13/2024 1225 Given 25 mg Oral Mira Burton RN          metoprolol succinate ER (Toprol XL) 24 hr tab 50 mg       Date Action Dose Route User    9/13/2024 1225 Given 50 mg Oral Mira Burton RN          sodium chloride 0.9% infusion       Date Action Dose Route User    9/12/2024 1558 New Bag (none) Intravenous Serena Arreola RN          torsemide (Demadex) tab 10 mg       Date Action Dose Route User    9/13/2024 1233 Given 10 mg Oral Mira Burton RN            Vitals (last day)       Date/Time Temp Pulse Resp BP SpO2 Weight O2 Device O2 Flow Rate (L/min) Who    09/13/24 1014 -- 61 15 -- 98 % -- -- -- KR    09/13/24 0800 97.5 °F (36.4 °C) 55 16 104/75 96 % -- None (Room air) -- KR    09/13/24 0400 98.2 °F (36.8 °C) 68 21 138/56 98 % -- None (Room air) -- VG    09/13/24 0000 98.2 °F (36.8 °C) 58 19 110/53 96 % -- None (Room air) -- VG    09/12/24 2100 98.6 °F (37 °C) 60 15 120/44 -- -- None (Room air) -- RP    09/12/24 2100 -- -- -- -- 98 % -- -- -- AU    09/12/24 1700 98.8 °F (37.1 °C) 57 18 165/62 99 % -- None (Room air) -- ID    09/12/24 1456 97.8 °F (36.6 °C) 56 18 160/53 99 % -- None (Room air) -- ID    09/12/24 1444 -- -- -- -- -- 155 lb (70.3 kg) -- -- IC     09/12/24 1345 -- 53 13 130/67 99 % -- -- -- AW    09/12/24 1200 -- 55 14 170/64 100 % -- -- -- AW    09/12/24 1120 -- 56 16 160/63 100 % -- -- -- AW    09/12/24 1117 -- 56 14 160/63 100 % -- None (Room air) -- EM    09/12/24 1036 -- 57 16 153/50 99 % -- None (Room air) -- EM    09/12/24 1009 97.6 °F (36.4 °C) -- -- -- -- -- -- -- EM    09/12/24 1005 -- 62 18 157/64 97 % -- None (Room air) -- EM           H&P    Chief Complaint: Right vision loss     History of Present Illness: Judith Kahn is a 84 year old female with PMHx Hypothyroidism, HLD, HTN, who presents for acute onset right vision loss.      Patient notes that yesterday afternoon she had sudden onset right-sided vision loss, notes that she felt like a curtain fell over her right eye, had blurriness in grayness of that right eye.  States that symptoms lasted for approximately 1 hour and then resolved spontaneously.  Denies any associated pain, headaches, temporal pain.  States that left eye had intact vision.  No recent head trauma, falls, injury.  No prior history of similar loss of vision.  No associated numbness, tingling, weakness of extremities.  Does have a history of glaucoma however well-managed.          ASSESSMENT / PLAN:      # Unilateral painless vision loss   - Now symptoms resolved    - Eye pressures within normal, sed rate not elevated, no history of migrainous headaches   - MRA head and neck, MRI brain pending   - Neurology on consult   - Will need f/u with primary ophthalmologist at discharge     # Hypertension - Continue home oral antihypertensives  # Hyperlipidemia - continue home statin   # Hypothyroidism - continue home levothyroxine        Code Status: Full Code    9/13 INTERNAL MED NOTE       Subjective:  - unable to complete MRI d/t issue with remote control for spinal stimulator given error when attempting to switch to MRI compatible mode   - vision at baseline and unchanged, denies other f/c, cp, sob, abd pain, new  numbness/tingling/weakness     Vital signs:  Temp:  [97.6 °F (36.4 °C)-98.8 °F (37.1 °C)] 98.2 °F (36.8 °C)  Pulse:  [53-68] 68  Resp:  [13-21] 21  BP: (110-165)/(44-67) 138/56  SpO2:  [96 %-100 %] 98 %      Physical Exam:    General: No acute distress  Respiratory: no wheezes, no rhonchi  Cardiovascular: S1, S2, regular rate and rhythm  Abdomen: Soft, Non-tender, non-distended, positive bowel sounds  Neuro: No new focal deficits.   Extremities: no edema     Diagnostic Data:    Labs:       Recent Labs   Lab 09/12/24  1012 09/13/24  0254   WBC 4.8 4.5   HGB 11.8* 10.9*   MCV 97.8 96.8   .0 163.0   INR 1.00 1.04              Recent Labs   Lab 09/12/24 1012 09/13/24  0254   GLU 87 86   BUN 33* 26*   CREATSERUM 1.90* 1.48*   CA 9.3 8.9   ALB 3.9 3.4    141   K 4.5 4.4    110   CO2 27.0 25.0   ALKPHO 76 67   AST 25 25   ALT 19 17   BILT 0.4 0.3   TP 6.9 6.0             Recent Labs   Lab 09/12/24 1012 09/12/24 2018 09/13/24  0254   TROPHS 62* 59* 63*              Recent Labs   Lab 09/12/24 1012 09/13/24  0254   PTP 13.2 13.6   INR 1.00 1.04            Assessment & Plan:  # Unilateral painless vision loss   - Now symptoms resolved    - Eye pressures within normal, sed rate not elevated, no history of migrainous headaches   - MRA head and neck, MRI brain unable to be obtained d/t spinal stimulator, will have nurse attempt to reach out to [facility] to see if we can troubleshoot    - Neurology on consult   - Will need f/u with primary ophthalmologist at discharge     # Hypertension - Continue home oral antihypertensives  # Hyperlipidemia - continue home statin   # Hypothyroidism - continue home levothyroxine

## 2024-09-13 NOTE — PLAN OF CARE
Assumed care at 0730  Orientated x4, NSR, RA   Denies pain     Nq4; no new neuro deficits   BP within parameters   CTA brain and neck and ECHO completed   Family at bedside   Needs met     Plan for discharge home if cleared by consults         NURSING DISCHARGE NOTE    Discharged Home via Wheelchair.  Accompanied by Family member and Support staff  Belongings Taken by patient/family.    All consults cleared for discharge   Patient and family educated on discharge paperwork   Medications sent to preferred pharmacy   No further questions from family or patient

## 2024-09-13 NOTE — PLAN OF CARE
Assumed care at around 2300. Pt neuro intact. Q4 neuro checks, see flowsheets for full assessment. No complaints of pain. IVF infusing per order. Pt updated on plan of care. Call light within reach.

## 2024-09-13 NOTE — DISCHARGE PLANNING
Patient follow-up appointment information:     Visit Type: Stroke follow up  Date of TIA/Stroke: 9/12/2024  Type of Stroke: TIA  Patient to follow-up: 4-6 weeks  OP Neurologist: n/a  New patient to neurology service: yes  Anticipated discharge (if known): 1-2 days  Discharge disposition (if known): Home      RN Stroke Navigator team  [phone]

## 2024-09-13 NOTE — PLAN OF CARE
Assumed care around 1930  Alert and oriented x4   NSR on tele and on RA   Denies any pain/discomfort   MRI Brain unable to complete d/t neuromodulator, MD notified  Neuro checks, see flowsheets   IVF infusing per order   Call light w/in reach   Report given to Maame SOLANO around 9447

## 2024-09-13 NOTE — SLP NOTE
SPEECH DAILY NOTE - INPATIENT    ASSESSMENT & PLAN   ASSESSMENT  Pt seen for dysphagia tx to assess tolerance with recommended diet, ensure proper utilization of aspiration precautions and provide pt/family education.  Patient sitting upright and midline in bed, awake and alert. Patient seen with items from recommended diet with patient demonstrating safe and efficient po intake without any overt clinical s/s of aspiration. No overt clinical s/s of aspiration.   Patient with efficient mastication of solids and complete clearing of the oral cavity free of overt clinical s/s of aspiration. Patient in agreement with diet recommendations without any concern expressed regarding swallow function.  Continue regular solids and thin liquids. No further skilled dysphagia intervention warranted.   Recommend follow up for communication/cognition assessment pending MRI and neuro workup.     Diet Recommendations - Solids: Regular  Diet Recommendations - Liquids: Thin Liquids    Compensatory Strategies Recommended: Small bites and sips;Slow rate  Aspiration Precautions: Upright position;Slow rate;Small bites and sips  Medication Administration Recommendations: One pill at a time    Patient Experiencing Pain: No                Treatment Plan  Treatment Plan/Recommendations: Aspiration precautions;Communication evaluation    Interdisciplinary Communication: Disussed with other staff                 GOALS  Goal #1 The patient will tolerate regular consistency and thin liquids without overt signs or symptoms of aspiration with 95 % accuracy over 1-2 session(s).  Met   Goal #2 The patient/family/caregiver will demonstrate understanding and implementation of aspiration precautions and swallow strategies independently over 1-2 session(s).     Met   Goal #3 Pending MRI and neuro workup, communication/cognition assessment will be performed.   Not Addressed       FOLLOW UP  Follow Up Needed (Documentation Required): Yes  SLP Follow-up Date:  09/16/24  Number of Visits to Meet Established Goals: 1    Session: 1 of 2    If you have any questions, please contact ALAINA Bunch

## 2024-09-13 NOTE — CONSULTS
[facility]   NEUROLOGY   CONSULT NOTE    Admission date: 9/12/2024  Reason for Consult: Diarrhea/stroke.  Chief Complaint:   Chief Complaint   Patient presents with    Stroke   ________________________________________________________________    History     History of Presenting Illness  84 year old female with multiple medical problems reported below including hypertension, hyperlipidemia, and hypothyroidism presented with acute onset right visual loss with blurriness in grayness lasting almost 1 hour with complete resolution of symptoms.  There was no associated headache, nausea, vomiting, jaw claudication, photophobia, phonophobia, weakness, numbness, paresthesias or loss of consciousness.  Currently symptom-free    History obtained from patient and her daughter with the help of  speaking nurse and chart review.    Past Medical History:    Back pain    Back problem    Depression    Disorder of thyroid    Essential hypertension    High blood pressure    High cholesterol    Hyperlipidemia    Incontinence    Osteoarthritis    Thyroid disease    Thyroid disease    hypothyroid     Past Surgical History:   Procedure Laterality Date    Cataract Bilateral 2015    Excis lumbar disk,one level      Eye surgery  2017    glaucoma    Hip replacement surgery      Removal gallbladder      Spine surgery procedure unlisted  2010    Spine surgery procedure unlisted  02/2020    nerve stimulator    Thyroidectomy      Total hip replacement Left 2013     Social History     Socioeconomic History    Marital status:    Tobacco Use    Smoking status: Never    Smokeless tobacco: Never   Vaping Use    Vaping status: Never Used   Substance and Sexual Activity    Alcohol use: Never    Drug use: Never   Social History Narrative    ** Merged History Encounter **          Social Determinants of Health     Food Insecurity: No Food Insecurity (9/12/2024)    Food Insecurity     Food Insecurity: Never true    Transportation Needs: No Transportation Needs (9/12/2024)    Transportation Needs     Lack of Transportation: No   Physical Activity: High Risk (8/21/2024)    Received from [facility] [place]    Exercise Vital Sign     On average, how many days per week do you engage in moderate to strenuous exercise (like a brisk walk)?: 0 days     On average, how many minutes do you engage in exercise at this level?: 0 min   Housing Stability: Low Risk  (9/12/2024)    Housing Stability     Housing Instability: No     Family History   Problem Relation Age of Onset    Cancer Father     Diabetes Mother     Cancer Sister      Allergies No Known Allergies    Home Meds  Current Outpatient Medications   Medication Instructions    atorvastatin (LIPITOR) 20 mg, Oral, Daily    DULoxetine (CYMBALTA) 60 mg, Oral, Daily    gabapentin (NEURONTIN) 300 mg, Oral, 3 times daily    gabapentin (NEURONTIN) 200 mg, Oral, Every morning    gabapentin (NEURONTIN) 300 mg, Oral, Every evening    hydroCHLOROthiazide 25 mg, Oral, Daily    HYDROcodone-acetaminophen 5-325 MG Oral Tab 1 tablet, Oral, 3 times daily PRN, Pt states she takes one a day    latanoprost 0.005 % Ophthalmic Solution 1 drop, Both Eyes, Nightly    levothyroxine (SYNTHROID) 100 mcg, Oral, Before breakfast    levothyroxine (SYNTHROID) 75 mcg, Oral, Before breakfast    lisinopril (PRINIVIL; ZESTRIL) 20 mg, Oral, Daily    losartan (COZAAR) 25 mg, Oral, Daily    metoprolol succinate ER (TOPROL XL) 50 mg, Oral, Daily    Omeprazole 40 mg, Oral, Daily    sertraline (ZOLOFT) 25 mg, Oral, Daily    sertraline (ZOLOFT) 25 mg, Oral, Nightly    spironolactone (ALDACTONE) 25 mg, Oral, Daily    torsemide (DEMADEX) 10 mg, Oral, Daily    traMADol (ULTRAM) 50 mg, Oral, Daily     Scheduled Meds:   enoxaparin  30 mg Subcutaneous Daily    aspirin  300 mg Rectal Daily    Or    aspirin  325 mg Oral Daily    atorvastatin  20 mg Oral Daily    DULoxetine  60 mg Oral Daily    gabapentin  300 mg Oral TID     levothyroxine  100 mcg Oral Before breakfast    losartan  25 mg Oral Daily    metoprolol succinate ER  50 mg Oral Daily    torsemide  10 mg Oral Daily     Continuous Infusions:   sodium chloride 75 mL/hr at 09/12/24 1558     PRN Meds:  acetaminophen    polyethylene glycol (PEG 3350)    sennosides    bisacodyl    benzonatate    guaiFENesin    glycerin-hypromellose-    sodium chloride    acetaminophen **OR** acetaminophen    labetalol    hydrALAzine    ondansetron    metoclopramide    OBJECTIVE   VITAL SIGNS:   Temp:  [97.5 °F (36.4 °C)-98.8 °F (37.1 °C)] 97.9 °F (36.6 °C)  Pulse:  [55-68] 65  Resp:  [15-22] 22  BP: (104-165)/(44-75) 127/59  SpO2:  [96 %-99 %] 98 %    PHYSICAL EXAM:    NEUROLOGIC:    Mental Status:  A&O x 4, Follows simple commands, no obvious aphasia or dysarthria  Cranial nerves: PERRL.  Visual fields full.  EOMI.  Face symmetric with normal movement bilaterally.  Hearing grossly intact. Tongue midline with normal movements.   Motor: Drift:  Absent; Motor exam is 5 out of 5 in all extremities bilaterally  Sensation: Intact to light touch bilaterally  Cerebellar: Normal Finger-To-Nose test        LABORATORY DATA:  Last 24 hour labs were reviewed in detail.  Recent Labs   Lab 09/12/24  1012 09/13/24  0254    141   K 4.5 4.4    110   CO2 27.0 25.0   GLU 87 86   BUN 33* 26*   CREATSERUM 1.90* 1.48*     Recent Labs   Lab 09/12/24  1012 09/13/24  0254   WBC 4.8 4.5   HGB 11.8* 10.9*   .0 163.0     Recent Labs   Lab 09/12/24  1012 09/13/24  0254   ALT 19 17   AST 25 25     Recent Labs   Lab 09/13/24  0254   MG 2.1   PHOS 4.1     Last A1c value was 6% done 9/12/2024.     Radiology:    CTA BRAIN + CTA CAROTIDS (CPT=70496/11508)    Result Date: 9/13/2024  CONCLUSION:  Unremarkable CT angiogram head and neck exam.   LOCATION:  [facility]   Dictated by (CST): Joel Melendez MD on 9/13/2024 at 2:23 PM     Finalized by (CST): Joel Melendez MD on 9/13/2024 at 2:26 PM       ASSESSMENT/PLAN   84 year old female with:    TIA/right amaurosis fugax complete resolution of symptoms.  Patient unable to get MRI because of spinal stimulator.  CT scan of the head did not show any acute intracranial abnormality.  CTA of head and neck was unremarkable for acute LVO/stenosis.  Echocardiogram did not report any shunting/thrombi.  LDL 60, A1c 6.0.  Patient currently on full dose aspirin and atorvastatin.  Will switch to baby aspirin, Plavix 75 mg daily and atorvastatin 20 mg daily.  Recommend outpatient event monitoring for 4 weeks.  Patient to follow-up with neurology clinic after 4 weeks.    Principal Problem:    Brain TIA  Active Problems:    Elevated troponin I level    Primary hypertension    Hyperlipidemia    Hypothyroidism       Zbigniew Sewell MD  Neurohospitalist  [facility]    Disclaimer: This record was dictated using Dragon software. There may be errors due to voice recognition problems that were not realized and corrected during the completion of the note.

## 2024-09-13 NOTE — OCCUPATIONAL THERAPY NOTE
OCCUPATIONAL THERAPY EVALUATION - INPATIENT    Room Number: 7620/7620-A   Evaluation Date: 9/13/2024   Type of Evaluation: Initial  Presenting Problem: TIA    Physician Order: IP Consult to Occupational Therapy  Reason for Therapy:  ADL/IADL Dysfunction and Discharge Planning      OCCUPATIONAL THERAPY ASSESSMENT   Patient is a 84 year old female admitted on 9/12/2024  with Presenting Problem: TIA. Co-Morbidities : anemia, DM, HTN  Patient is currently functioning near baseline with self-care and functional mobility.  Prior to admission, patient's baseline is independent level.  Patient met all OT goals at mod I level.  Patient reports no further questions/concerns at this time.     Patient will be discharged from OT services at this time.  Will benefit from return to home setting at discharge from hospital.      WEIGHT BEARING RESTRICTION  Weight Bearing Restriction: None                Recommendations for nursing staff:   Transfers: one person   Toileting location: Toilet    EVALUATION SESSION:  Patient at start of session: supine  FUNCTIONAL TRANSFER ASSESSMENT  Sit to Stand: Chair; Edge of Bed  Edge of Bed: Modified Independent  Chair: Modified Independent      BED MOBILITY  Rolling: Independent  Supine to Sit : Modified Independent  Sit to Supine (OT): Modified Independent  Scooting: Mod I      BALANCE ASSESSMENT  Static Sitting: Modified Independent  Sitting Bilateral: Independent  Static Standing: Modified Independent  Standing Bilateral: Modified Independent      FUNCTIONAL ADL ASSESSMENT  Eating: Independent  Grooming Seated: Modified Independent  LB Dressing Seated: Modified Independent  Toileting Seated: Modified Independent        ACTIVITY TOLERANCE: WFL   HR: 65    Heart Rate Source: Monitor         BP: 127/59  BP Method: Automatic  Patient Position: Sitting    O2 SATURATIONS       COGNITION  Overall Cognitive Status:  WFL - within functional limits  COGNITION ASSESSMENTS       Upper Extremity:   ROM:  within functional limits   Strength: is within functional limits   Coordination:  Gross motor: WFL  Fine motor: WFL  Sensation: Light touch:  intact; no c/o numbness or tingling    Vision:  Able to read clock on wall without difficulty  Scanning, tracking and id objects in all visual quadrants without difficulty.  Patient reports transient loss of vision on day of admit lasted for 45 min and has completely resolved.    EDUCATION PROVIDED  Patient: Role of Occupational Therapy; Plan of Care; Discharge Recommendations; Adaptive Equipment Recommendations; Functional Transfer Techniques; Fall Prevention; Posture/Positioning; Energy Conservation; Compensatory ADL Techniques  Patient's Response to Education: Verbalized Understanding      Therapist comments: OT educated patient on safety,  sequencing, energy conservation, pain management, home modifications and adaptive equipment to increase independence with ADLs.      Patient End of Session: Up in chair;With  staff;Needs met;Call light within reach;RN aware of session/findings;All patient questions and concerns addressed;Alarm set;Discussed recommendations with /    OCCUPATIONAL PROFILE    HOME SITUATION  Type of Home: House  Home Layout: Two level  Lives With: Daughter;Family    Toilet and Equipment: Standard height toilet  Shower/Tub and Equipment: Walk-in shower  Other Equipment: None    Occupation/Status: retired  Hand Dominance: Right  Drives: No  Patient Regularly Uses: Reading glasses    Prior Level of Function: Mod I with ADLs and functional mobility with no use of device in the house.  Uses a w/c in the community when out longer periods.  Patient reports her family is very supportive and will assist with IADLs prn.  Patient reports a mechanical fall 1 month ago and was able to get up without assist.    SUBJECTIVE  PAIN ASSESSMENT  Rating: Unable to rate  Location: chronic joint pain BUE and BLE, back  Management Techniques: Activity  promotion    OBJECTIVE  Precautions: Bed/chair alarm  Fall Risk: Standard fall risk    WEIGHT BEARING RESTRICTION  Weight Bearing Restriction: None                AM-PAC ‘6-Clicks’ Inpatient Daily Activity Short Form  -   Putting on and taking off regular lower body clothing?: None  -   Bathing (including washing, rinsing, drying)?: None  -   Toileting, which includes using toilet, bedpan or urinal? : None  -   Putting on and taking off regular upper body clothing?: None  -   Taking care of personal grooming such as brushing teeth?: None  -   Eating meals?: None    AM-PAC Score:  Score: 24  Approx Degree of Impairment: 0%  Standardized Score (AM-PAC Scale): 57.54      ADDITIONAL TESTS     NEUROLOGICAL FINDINGS        PLAN   Patient has been evaluated and presents with no skilled Occupational Therapy needs at this time.  Patient discharged from Occupational Therapy services.  Please re-order if a new functional limitation presents during this admission.      Patient Evaluation Complexity Level:   Occupational Profile/Medical History LOW - Brief history including review of medical or therapy records    Specific performance deficits impacting engagement in ADL/IADL LOW  1 - 3 performance deficits    Client Assessment/Performance Deficits LOW - No comorbidities nor modifications of tasks    Clinical Decision Making LOW - Analysis of occupational profile, problem-focused assessments, limited treatment options    Overall Complexity LOW     OT Session Time: 30 minutes  Self-Care Home Management: 15 minutes

## 2024-09-13 NOTE — PROGRESS NOTES
[facility]   part of [facility]     Hospitalist Progress Note     Judith Wattscatgeoff Patient Status:  Inpatient    3/26/1940 MRN [id number]   Location [facility] 7NE-A Attending Elvis, Lawson Perales, *   Hosp Day # 1 PCP TOOTIE Stout     Chief Complaint: Right vision loss    Subjective:      - unable to complete MRI d/t issue with remote control for spinal stimulator given error when attempting to switch to MRI compatible mode   - vision at baseline and unchanged, denies other f/c, cp, sob, abd pain, new numbness/tingling/weakness    Objective:    Review of Systems:   A comprehensive review of systems was completed; pertinent positive and negatives stated in subjective.    Vital signs:  Temp:  [97.6 °F (36.4 °C)-98.8 °F (37.1 °C)] 98.2 °F (36.8 °C)  Pulse:  [53-68] 68  Resp:  [13-21] 21  BP: (110-165)/(44-67) 138/56  SpO2:  [96 %-100 %] 98 %    Physical Exam:    General: No acute distress  Respiratory: no wheezes, no rhonchi  Cardiovascular: S1, S2, regular rate and rhythm  Abdomen: Soft, Non-tender, non-distended, positive bowel sounds  Neuro: No new focal deficits.   Extremities: no edema    Diagnostic Data:    Labs:  Recent Labs   Lab 24  0254   WBC 4.8 4.5   HGB 11.8* 10.9*   MCV 97.8 96.8   .0 163.0   INR 1.00 1.04       Recent Labs   Lab 24  0254   GLU 87 86   BUN 33* 26*   CREATSERUM 1.90* 1.48*   CA 9.3 8.9   ALB 3.9 3.4    141   K 4.5 4.4    110   CO2 27.0 25.0   ALKPHO 76 67   AST 25 25   ALT 19 17   BILT 0.4 0.3   TP 6.9 6.0       Estimated Creatinine Clearance: 30.8 mL/min (A) (based on SCr of 1.48 mg/dL (H)).    Recent Labs   Lab 24  0254   TROPHS 62* 59* 63*       Recent Labs   Lab 24  1012 24  0254   PTP 13.2 13.6   INR 1.00 1.04                  Microbiology    No results found for this visit on 24.      Imaging: Reviewed in Epic.    Medications:    enoxaparin   30 mg Subcutaneous Daily    aspirin  300 mg Rectal Daily    Or    aspirin  325 mg Oral Daily    atorvastatin  20 mg Oral Daily    DULoxetine  60 mg Oral Daily    gabapentin  300 mg Oral TID    levothyroxine  100 mcg Oral Before breakfast    losartan  25 mg Oral Daily    metoprolol succinate ER  50 mg Oral Daily    torsemide  10 mg Oral Daily       Assessment & Plan:      # Unilateral painless vision loss   - Now symptoms resolved    - Eye pressures within normal, sed rate not elevated, no history of migrainous headaches   - MRA head and neck, MRI brain unable to be obtained d/t spinal stimulator, will have nurse attempt to reach out to [facility] to see if we can troubleshoot    - Neurology on consult   - Will need f/u with primary ophthalmologist at discharge     # Hypertension - Continue home oral antihypertensives  # Hyperlipidemia - continue home statin   # Hypothyroidism - continue home levothyroxine       Lawson Leal MD    Supplementary Documentation:     Quality:  DVT Mechanical Prophylaxis:   SCDs, Early ambuation  DVT Pharmacologic Prophylaxis   Medication    enoxaparin (Lovenox) 30 MG/0.3ML SUBQ injection 30 mg    DVT Pharmacologic prophylaxis: Aspirin 325 mg           Code Status: Full Code  Whyte: No urinary catheter in place  Whyte Duration (in days):   Central line:    BLANCA:     The 21st Century Cures Act makes medical notes like these available to patients in the interest of transparency. Please be advised this is a medical document. Medical documents are intended to carry relevant information, facts as evident, and the clinical opinion of the practitioner. The medical note is intended as peer to peer communication and may appear blunt or direct. It is written in medical language and may contain abbreviations or verbiage that are unfamiliar.

## 2024-09-17 ENCOUNTER — PATIENT OUTREACH (OUTPATIENT)
Dept: CASE MANAGEMENT | Age: 84
End: 2024-09-17

## 2024-09-17 NOTE — PROGRESS NOTES
Hospital follow up.    Visit Type: Stroke follow up  Date of TIA/Stroke: 9/12/2024  Type of Stroke: TIA  Patient to follow-up: 4-6 weeks    Ryann Rainey M.D.  Neurology; Vascular Neurology  [address] [place] [address] [place] [address]  [phone]  Wednesday 10/16 @2:20    Confirmed with patient's daughter.    Closing encounter.

## 2024-09-18 ENCOUNTER — HOSPITAL ENCOUNTER (OUTPATIENT)
Dept: CV DIAGNOSTICS | Facility: HOSPITAL | Age: 84
Discharge: HOME | End: 2024-09-18
Payer: MEDICAID

## 2024-09-18 DIAGNOSIS — G45.9 BRAIN TIA: ICD-10-CM

## 2024-09-18 PROBLEM — G45.3 AF (AMAUROSIS FUGAX): Status: ACTIVE | Noted: 2024-09-18

## 2024-09-18 PROCEDURE — 93270 REMOTE 30 DAY ECG REV/REPORT: CPT

## 2024-09-18 PROCEDURE — 93271 ECG/MONITORING AND ANALYSIS: CPT

## 2024-09-18 NOTE — DISCHARGE SUMMARY
[facility]     Judith Kahn Patient Status:  Inpatient    3/26/1940 MRN [id number]   Location [facility] 7NE-A Attending No att. providers found   Hosp Day # 1 PCP TOOTIE Stout     Date of Admission: 2024  Date of Discharge: 2024  Discharge Disposition: Home or Self Care    Admitting Diagnosis:   Brain TIA [G45.9]  Elevated troponin I level [R79.89]    Hospital Discharge Diagnoses:   Acute right-sided amaurosis fugax  Hypertension  Hyperlipidemia  Hypothyroidism    Lace+ Score: 49  59-90 High Risk  29-58 Medium Risk  0-28   Low Risk.    TCM Follow-Up Recommendation:  LACE 29-58: Moderate Risk of readmission after discharge from the hospital.        Discharge Diagnosis:   Acute right-sided amaurosis fugax    History of Present Illness: Judith Kahn is a 84 year old female with PMHx Hypothyroidism, HLD, HTN, who presents for acute onset right vision loss.      Patient notes that yesterday afternoon she had sudden onset right-sided vision loss, notes that she felt like a curtain fell over her right eye, had blurriness in grayness of that right eye.  States that symptoms lasted for approximately 1 hour and then resolved spontaneously.  Denies any associated pain, headaches, temporal pain.  States that left eye had intact vision.  No recent head trauma, falls, injury.  No prior history of similar loss of vision.  No associated numbness, tingling, weakness of extremities.  Does have a history of glaucoma however well-managed.    Brief Synopsis: Patient presented with transient right-sided vision loss, patient completely returned back to baseline after admission.  Neurology consulted.  Underwent CT head and CTA head and neck without acute findings, no LVO or aneurysms noted.  Patient could not get MRI brain due to spinal stimulator.  Neurology started aspirin, Plavix due to concern for possible TIA leading to symptom onset.  Patient discharged to home with  outpatient follow-up with neurology and ophthalmology.    Procedures during hospitalization:   None    Incidental or significant findings and recommendations (brief descriptions):  Outpatient follow-up with neurology and ophthalmology for evaluation of amaurosis fugax, started on DAPT per neurology    Lab/Test results pending at Discharge:   None    Consultants:  Neurology    Discharge Medication List:     Discharge Medications        START taking these medications        Instructions Prescription details   aspirin 81 MG Tbec      Take 1 tablet (81 mg total) by mouth daily.   Quantity: 30 tablet  Refills: 2     clopidogrel 75 MG Tabs  Commonly known as: Plavix      Take 1 tablet (75 mg total) by mouth daily.   Quantity: 30 tablet  Refills: 2            CHANGE how you take these medications        Instructions Prescription details   gabapentin 100 MG Caps  Commonly known as: Neurontin  What changed: Another medication with the same name was removed. Continue taking this medication, and follow the directions you see here.      Take 3 capsules (300 mg total) by mouth 3 (three) times daily.   Refills: 0     levothyroxine 75 MCG Tabs  Commonly known as: Synthroid  What changed: Another medication with the same name was removed. Continue taking this medication, and follow the directions you see here.      Take 100 mcg by mouth before breakfast.   Refills: 0            CONTINUE taking these medications        Instructions Prescription details   atorvastatin 10 MG Tabs  Commonly known as: Lipitor      Take 2 tablets (20 mg total) by mouth daily.   Refills: 0     DULoxetine 60 MG Cpep  Commonly known as: Cymbalta      Take 1 capsule (60 mg total) by mouth daily.   Refills: 0     HYDROcodone-acetaminophen 5-325 MG Tabs  Commonly known as: Norco      Take 1 tablet by mouth 3 (three) times daily as needed for Pain. Pt states she takes one a day   Refills: 0     latanoprost 0.005 % Soln  Commonly known as: Xalatan      Place 1  drop into both eyes nightly.   Refills: 0     losartan 25 MG Tabs  Commonly known as: Cozaar      Take 1 tablet (25 mg total) by mouth daily.   Refills: 0     metoprolol succinate  MG Tb24  Commonly known as: Toprol XL      Take 0.5 tablets (50 mg total) by mouth daily.   Refills: 0     torsemide 10 MG Tabs  Commonly known as: DEMADEX      Take 1 tablet (10 mg total) by mouth daily.   Refills: 0            STOP taking these medications      hydroCHLOROthiazide 25 MG Tabs        lisinopril 20 MG Tabs  Commonly known as: Prinivil; Zestril        Omeprazole 40 MG Cpdr        sertraline 25 MG Tabs  Commonly known as: Zoloft        spironolactone 25 MG Tabs  Commonly known as: Aldactone        traMADol 50 MG Tabs  Commonly known as: Ultram                  Where to Get Your Medications        These medications were sent to [facility] [address] [place] [address] [phone], [phone]  [address] [place] [address]      Phone: [phone]   aspirin 81 MG Tbec  clopidogrel 75 MG Tabs         [facility] reviewed: Yes    Follow-up appointment:   Your primary opthamologist    Call today  Establish care to perform full ocular exam    Kerry Abrams PA  [facility] [id number] [phone]    Schedule an appointment as soon as possible for a visit in 1 week(s)  For routine follow-up after hospitilization    Analisa May DO  [address] [address] [phone]    Schedule an appointment as soon as possible for a visit in 1 month(s)  TIA fllow up      Vital signs:       Physical Exam:  See exam from day of discharge note  -----------------------------------------------------------------------------------------------  PATIENT DISCHARGE INSTRUCTIONS: See electronic chart    Lawson Leal MD 9/18/2024    Time spent:  35 minutes

## 2024-09-19 ENCOUNTER — APPOINTMENT (OUTPATIENT)
Dept: GENERAL RADIOLOGY | Facility: HOSPITAL | Age: 84
End: 2024-09-19
Attending: EMERGENCY MEDICINE
Payer: MEDICAID

## 2024-09-19 ENCOUNTER — HOSPITAL ENCOUNTER (EMERGENCY)
Facility: HOSPITAL | Age: 84
Discharge: HOME | End: 2024-09-19
Attending: EMERGENCY MEDICINE
Payer: MEDICAID

## 2024-09-19 ENCOUNTER — APPOINTMENT (OUTPATIENT)
Dept: CT IMAGING | Facility: HOSPITAL | Age: 84
End: 2024-09-19
Attending: EMERGENCY MEDICINE
Payer: MEDICAID

## 2024-09-19 VITALS
OXYGEN SATURATION: 99 % | BODY MASS INDEX: 37.57 KG/M2 | HEART RATE: 60 BPM | TEMPERATURE: 98 F | WEIGHT: 167 LBS | DIASTOLIC BLOOD PRESSURE: 56 MMHG | RESPIRATION RATE: 14 BRPM | SYSTOLIC BLOOD PRESSURE: 158 MMHG | HEIGHT: 56 IN

## 2024-09-19 DIAGNOSIS — I48.91 NEW ONSET A-FIB (HCC): Primary | ICD-10-CM

## 2024-09-19 LAB
ALBUMIN SERPL-MCNC: 4.2 G/DL (ref 3.2–4.8)
ALBUMIN/GLOB SERPL: 1.2 {RATIO} (ref 1–2)
ALP LIVER SERPL-CCNC: 79 U/L
ALT SERPL-CCNC: 25 U/L
ANION GAP SERPL CALC-SCNC: 7 MMOL/L (ref 0–18)
AST SERPL-CCNC: 39 U/L (ref ?–34)
BASOPHILS # BLD AUTO: 0.02 X10(3) UL (ref 0–0.2)
BASOPHILS NFR BLD AUTO: 0.3 %
BILIRUB SERPL-MCNC: 0.5 MG/DL (ref 0.2–1.1)
BUN BLD-MCNC: 31 MG/DL (ref 9–23)
CALCIUM BLD-MCNC: 9.7 MG/DL (ref 8.7–10.4)
CHLORIDE SERPL-SCNC: 108 MMOL/L (ref 98–112)
CO2 SERPL-SCNC: 25 MMOL/L (ref 21–32)
CREAT BLD-MCNC: 1.71 MG/DL
EGFRCR SERPLBLD CKD-EPI 2021: 29 ML/MIN/1.73M2 (ref 60–?)
EOSINOPHIL # BLD AUTO: 0.17 X10(3) UL (ref 0–0.7)
EOSINOPHIL NFR BLD AUTO: 2.9 %
ERYTHROCYTE [DISTWIDTH] IN BLOOD BY AUTOMATED COUNT: 13.4 %
GLOBULIN PLAS-MCNC: 3.5 G/DL (ref 2–3.5)
GLUCOSE BLD-MCNC: 79 MG/DL (ref 70–99)
HCT VFR BLD AUTO: 38.3 %
HGB BLD-MCNC: 12.5 G/DL
IMM GRANULOCYTES # BLD AUTO: 0.02 X10(3) UL (ref 0–1)
IMM GRANULOCYTES NFR BLD: 0.3 %
LYMPHOCYTES # BLD AUTO: 1.46 X10(3) UL (ref 1–4)
LYMPHOCYTES NFR BLD AUTO: 25.2 %
MCH RBC QN AUTO: 31.6 PG (ref 26–34)
MCHC RBC AUTO-ENTMCNC: 32.6 G/DL (ref 31–37)
MCV RBC AUTO: 96.7 FL
MONOCYTES # BLD AUTO: 0.54 X10(3) UL (ref 0.1–1)
MONOCYTES NFR BLD AUTO: 9.3 %
NEUTROPHILS # BLD AUTO: 3.58 X10 (3) UL (ref 1.5–7.7)
NEUTROPHILS # BLD AUTO: 3.58 X10(3) UL (ref 1.5–7.7)
NEUTROPHILS NFR BLD AUTO: 62 %
OSMOLALITY SERPL CALC.SUM OF ELEC: 295 MOSM/KG (ref 275–295)
PLATELET # BLD AUTO: 225 10(3)UL (ref 150–450)
POTASSIUM SERPL-SCNC: 4.4 MMOL/L (ref 3.5–5.1)
PROT SERPL-MCNC: 7.7 G/DL (ref 5.7–8.2)
RBC # BLD AUTO: 3.96 X10(6)UL
SODIUM SERPL-SCNC: 140 MMOL/L (ref 136–145)
WBC # BLD AUTO: 5.8 X10(3) UL (ref 4–11)

## 2024-09-19 PROCEDURE — 70450 CT HEAD/BRAIN W/O DYE: CPT | Performed by: EMERGENCY MEDICINE

## 2024-09-19 PROCEDURE — 85025 COMPLETE CBC W/AUTO DIFF WBC: CPT

## 2024-09-19 PROCEDURE — 36415 COLL VENOUS BLD VENIPUNCTURE: CPT

## 2024-09-19 PROCEDURE — 99285 EMERGENCY DEPT VISIT HI MDM: CPT

## 2024-09-19 PROCEDURE — 93010 ELECTROCARDIOGRAM REPORT: CPT

## 2024-09-19 PROCEDURE — 85025 COMPLETE CBC W/AUTO DIFF WBC: CPT | Performed by: EMERGENCY MEDICINE

## 2024-09-19 PROCEDURE — 93005 ELECTROCARDIOGRAM TRACING: CPT

## 2024-09-19 PROCEDURE — 80053 COMPREHEN METABOLIC PANEL: CPT | Performed by: EMERGENCY MEDICINE

## 2024-09-19 PROCEDURE — 71045 X-RAY EXAM CHEST 1 VIEW: CPT | Performed by: EMERGENCY MEDICINE

## 2024-09-19 PROCEDURE — 80053 COMPREHEN METABOLIC PANEL: CPT

## 2024-09-19 NOTE — ED INITIAL ASSESSMENT (HPI)
Pt arrives to ED for evaluation of an abnormal EKG rhythm collected from the pt halter monitor, which was placed yesterday. Pt denies CP, pt c/o dizziness and being tired. Pt family unsure why the pt is wearing a halter monitor. Pt possibly had a little stroke one week ago and has been tired since.

## 2024-09-19 NOTE — PAYOR COMM NOTE
--------------  DISCHARGE REVIEW    Payor: [facility]  Subscriber #:  [id number]  Authorization Number: [id number]    Admit date: 24  Admit time:   2:39 PM  Discharge Date: 2024  6:40 PM     Admitting Physician: Lawson Leal MD  Attending Physician:  No att. providers found  Primary Care Physician: Kerry Abrams PA          Discharge Summary Notes        Discharge Summary signed by Lawson Leal MD at 2024  2:48 PM       Author: Lawson Leal MD Specialty: HOSPITALIST Author Type: Physician    Filed: 2024  2:48 PM Date of Service: 2024  2:46 PM Status: Signed    : Lawson Leal MD (Physician)           [facility]     Judith Kahn Patient Status:  Inpatient    3/26/1940 MRN [id number]   Location [facility] 7NE-A Attending No att. providers found   Hosp Day # 1 PCP TOOTIE Stout     Date of Admission: 2024  Date of Discharge: 2024  Discharge Disposition: Home or Self Care    Admitting Diagnosis:   Brain TIA [G45.9]  Elevated troponin I level [R79.89]    Hospital Discharge Diagnoses:   Acute right-sided amaurosis fugax  Hypertension  Hyperlipidemia  Hypothyroidism    Lace+ Score: 49  59-90 High Risk  29-58 Medium Risk  0-28   Low Risk.    TCM Follow-Up Recommendation:  LACE 29-58: Moderate Risk of readmission after discharge from the hospital.        Discharge Diagnosis:   Acute right-sided amaurosis fugax    History of Present Illness: Judith Kahn is a 84 year old female with PMHx Hypothyroidism, HLD, HTN, who presents for acute onset right vision loss.      Patient notes that yesterday afternoon she had sudden onset right-sided vision loss, notes that she felt like a curtain fell over her right eye, had blurriness in grayness of that right eye.  States that symptoms lasted for approximately 1 hour and then resolved spontaneously.  Denies any  associated pain, headaches, temporal pain.  States that left eye had intact vision.  No recent head trauma, falls, injury.  No prior history of similar loss of vision.  No associated numbness, tingling, weakness of extremities.  Does have a history of glaucoma however well-managed.    Brief Synopsis: Patient presented with transient right-sided vision loss, patient completely returned back to baseline after admission.  Neurology consulted.  Underwent CT head and CTA head and neck without acute findings, no LVO or aneurysms noted.  Patient could not get MRI brain due to spinal stimulator.  Neurology started aspirin, Plavix due to concern for possible TIA leading to symptom onset.  Patient discharged to home with outpatient follow-up with neurology and ophthalmology.    Procedures during hospitalization:   None    Incidental or significant findings and recommendations (brief descriptions):  Outpatient follow-up with neurology and ophthalmology for evaluation of amaurosis fugax, started on DAPT per neurology    Lab/Test results pending at Discharge:   None    Consultants:  Neurology    Discharge Medication List:     Discharge Medications        START taking these medications        Instructions Prescription details   aspirin 81 MG Tbec      Take 1 tablet (81 mg total) by mouth daily.   Quantity: 30 tablet  Refills: 2     clopidogrel 75 MG Tabs  Commonly known as: Plavix      Take 1 tablet (75 mg total) by mouth daily.   Quantity: 30 tablet  Refills: 2            CHANGE how you take these medications        Instructions Prescription details   gabapentin 100 MG Caps  Commonly known as: Neurontin  What changed: Another medication with the same name was removed. Continue taking this medication, and follow the directions you see here.      Take 3 capsules (300 mg total) by mouth 3 (three) times daily.   Refills: 0     levothyroxine 75 MCG Tabs  Commonly known as: Synthroid  What changed: Another medication with the same  name was removed. Continue taking this medication, and follow the directions you see here.      Take 100 mcg by mouth before breakfast.   Refills: 0            CONTINUE taking these medications        Instructions Prescription details   atorvastatin 10 MG Tabs  Commonly known as: Lipitor      Take 2 tablets (20 mg total) by mouth daily.   Refills: 0     DULoxetine 60 MG Cpep  Commonly known as: Cymbalta      Take 1 capsule (60 mg total) by mouth daily.   Refills: 0     HYDROcodone-acetaminophen 5-325 MG Tabs  Commonly known as: Norco      Take 1 tablet by mouth 3 (three) times daily as needed for Pain. Pt states she takes one a day   Refills: 0     latanoprost 0.005 % Soln  Commonly known as: Xalatan      Place 1 drop into both eyes nightly.   Refills: 0     losartan 25 MG Tabs  Commonly known as: Cozaar      Take 1 tablet (25 mg total) by mouth daily.   Refills: 0     metoprolol succinate  MG Tb24  Commonly known as: Toprol XL      Take 0.5 tablets (50 mg total) by mouth daily.   Refills: 0     torsemide 10 MG Tabs  Commonly known as: DEMADEX      Take 1 tablet (10 mg total) by mouth daily.   Refills: 0            STOP taking these medications      hydroCHLOROthiazide 25 MG Tabs        lisinopril 20 MG Tabs  Commonly known as: Prinivil; Zestril        Omeprazole 40 MG Cpdr        sertraline 25 MG Tabs  Commonly known as: Zoloft        spironolactone 25 MG Tabs  Commonly known as: Aldactone        traMADol 50 MG Tabs  Commonly known as: Ultram                  Where to Get Your Medications        These medications were sent to [facility] [address] [place] [address] [address] [phone], [phone]  [address] [place] [id number]      Phone: [phone]   aspirin 81 MG Tbec  clopidogrel 75 MG Tabs         ILPM reviewed: Yes    Follow-up appointment:   Your primary opthamologist    Call today  Establish care to perform full ocular exam    Kerry Abrams PA  [address] [facility]  [place] [address]  [phone]    Schedule an appointment as soon as possible for a visit in 1 week(s)  For routine follow-up after hospitilization    Analisa May DO  [address] [address]  [phone]    Schedule an appointment as soon as possible for a visit in 1 month(s)  TIA fllow up      Vital signs:       Physical Exam:  See exam from day of discharge note  -----------------------------------------------------------------------------------------------  PATIENT DISCHARGE INSTRUCTIONS: See electronic chart    Lawson Leal MD 9/18/2024    Time spent:  35 minutes      Electronically signed by Lawson Leal MD on 9/18/2024  2:48 PM         REVIEWER COMMENTS

## 2024-09-19 NOTE — PROGRESS NOTES
Tracey @ [address] [facility] office Advocate notified of critical finding on 30 day Event   Monitor recording.  Patient went from NSR into Atrial flutter.  16:25 pm

## 2024-09-20 LAB
ATRIAL RATE: 60 BPM
P AXIS: 52 DEGREES
P-R INTERVAL: 142 MS
Q-T INTERVAL: 422 MS
QRS DURATION: 78 MS
QTC CALCULATION (BEZET): 422 MS
R AXIS: 8 DEGREES
T AXIS: 12 DEGREES
VENTRICULAR RATE: 60 BPM

## 2024-09-20 NOTE — ED PROVIDER NOTES
Patient Seen in: [facility] Emergency Department      History     Chief Complaint   Patient presents with    Abnormal Result     Stated Complaint: Abnormal cardiac monitor- advised to come in    Subjective:   HPI    This is an 84-year-old woman, here for evaluation  after the Holter monitor she was wearing alerted staff that she had separate episodes of atrial fibrillation earlier today.  Patient was recently admitted for TIA, had amaurosis fugax, was discharged home on aspirin and Plavix.  Patient states she is feeling well denies any recent chest pain shortness of breath focal weakness or numbness any other new complaints.    Objective:   No pertinent past medical history.            Past Surgical History:   Procedure Laterality Date    Cataract Bilateral 2015    Excis lumbar disk,one level      Eye surgery  2017    glaucoma    Hip replacement surgery      Removal gallbladder      Spine surgery procedure unlisted  2010    Spine surgery procedure unlisted  02/2020    nerve stimulator    Thyroidectomy      Total hip replacement Left 2013                Social History     Socioeconomic History    Marital status:    Tobacco Use    Smoking status: Never    Smokeless tobacco: Never   Vaping Use    Vaping status: Never Used   Substance and Sexual Activity    Alcohol use: Never    Drug use: Never   Social History Narrative    ** Merged History Encounter **          Social Determinants of Health     Food Insecurity: No Food Insecurity (9/12/2024)    Food Insecurity     Food Insecurity: Never true   Transportation Needs: No Transportation Needs (9/12/2024)    Transportation Needs     Lack of Transportation: No   Physical Activity: High Risk (8/21/2024)    [facility]    Exercise Vital Sign     On average, how many days per week do you engage in moderate to strenuous exercise (like a brisk walk)?: 0 days     On average, how many minutes do you engage in exercise at this level?: 0 min    Housing Stability: Low Risk  (9/12/2024)    Housing Stability     Housing Instability: No              Review of Systems    Positive for stated Chief Complaint: Abnormal Result    Other systems are as noted in HPI.  Constitutional and vital signs reviewed.      All other systems reviewed and negative except as noted above.    Physical Exam     ED Triage Vitals [09/19/24 1751]   /66   Pulse 57   Resp 14   Temp 97.9 °F (36.6 °C)   Temp src Temporal   SpO2 96 %   O2 Device None (Room air)       Current Vitals:   Vital Signs  BP: 158/56  Pulse: 60  Resp: 14  Temp: 97.9 °F (36.6 °C)  Temp src: Temporal  MAP (mmHg): 86    Oxygen Therapy  SpO2: 99 %  O2 Device: None (Room air)            Physical Exam        Physical Exam  Vitals signs and nursing note reviewed.   General:  Patient laying supine in the bed in no acute distress  Head: Normocephalic and atraumatic.   HEENT:  Mucous membranes are moist.   Cardiovascular:  Normal rate and regular rhythm.  No Edema  Pulmonary:  Pulmonary effort is normal.  Normal breath sounds. No wheezing, rhonchi or rales.   Abdominal: Soft nontender nondistended, normal bowel sounds, no guarding no rebound tenderness  Skin: Warm and dry  Neurological: Awake alert, speech is normal        ED Course     Labs Reviewed   COMP METABOLIC PANEL (14) - Abnormal; Notable for the following components:       Result Value    BUN 31 (*)     Creatinine 1.71 (*)     eGFR-Cr 29 (*)     AST 39 (*)     All other components within normal limits   CBC WITH DIFFERENTIAL WITH PLATELET   TROPONIN I HIGH SENSITIVITY   RAINBOW DRAW BLUE     EKG    Rate, intervals and axes as noted on EKG Report.  Rate: 60  Rhythm: Sinus Rhythm  Reading: No acute ischemic change                 CT BRAIN OR HEAD (CPT=70450)    Result Date: 9/19/2024  PROCEDURE:  CT BRAIN OR HEAD (95705)  COMPARISON:  [facility] , CT, CTA BRAIN + CTA CAROTIDS (CPT=70496/72392), 9/13/2024, 1:49 PM.  [facility] , CT, CT STROKE BRAIN (NO IV)(CPT=70450),  9/12/2024, 10:27 AM.  INDICATIONS:  recent tia, now with new afib, eval for any hemorrhage  TECHNIQUE:  Noncontrast CT scanning is performed through the brain. Dose reduction techniques were used. Dose information is transmitted to the ACR (American College of Radiology) NRDR (National Radiology Data Registry) which includes the Dose Index Registry.  PATIENT STATED HISTORY: (As transcribed by Technologist)  AFIB, recent TIA.   FINDINGS: Stable mild global brain parenchymal volume loss without overt hydrocephalus.  There is no midline shift or mass-effect.  The basal cisterns are patent.  The gray-white matter differentiation is intact.  There is no acute intracranial hemorrhage or extra-axial fluid collection.  No evidence of acute territorial infarction.  Stable mild chronic microvascular ischemic changes in the cerebral white matter.  There is no evident fracture.  Trace ethmoid mucosal thickening.  The mastoid air cells are unremarkable.  Bilateral intra-ocular lens implants.             CONCLUSION:  No acute intracranial abnormality identified.  Stable chronic ischemic changes as above. If there is clinical concern for acute ischemia/infarction, an MRI of the brain would be recommended for further evaluation.    LOCATION:  [id number]   Dictated by (CST): Anatoliy Naidu MD on 9/19/2024 at 8:49 PM     Finalized by (CST): Anatoliy Naidu MD on 9/19/2024 at 8:52 PM       XR CHEST AP PORTABLE  (CPT=71045)    Result Date: 9/19/2024  PROCEDURE:  XR CHEST AP PORTABLE  (CPT=71045)  TECHNIQUE:  AP chest radiograph was obtained.  COMPARISON:  [facility] , XR, XR CHEST AP PORTABLE  (CPT=71045), 9/12/2024, 10:56 AM.  INDICATIONS:  Abnormal cardiac monitor- advised to come in  PATIENT STATED HISTORY: (As transcribed by Technologist)  Patient offered no additional history at this time.    FINDINGS:  Lung volumes are satisfactory.  No new consolidation or pleural effusion.  Scattered opacities which may be scarring, mostly in the mid  to lower left lung is similar to the prior.  Heart and pulmonary vessels appear stable, with moderate cardiomegaly.  Smooth mediastinal contours.  Spinal stimulator is partially imaged.  Cholecystectomy clips.             CONCLUSION:  Stable frontal view of the chest.   LOCATION:  [facility]      Dictated by (CST): Naman Burns MD on 9/19/2024 at 8:41 PM     Finalized by (CST): Naman Burns MD on 9/19/2024 at 8:42 PM       CTA BRAIN + CTA CAROTIDS (CPT=70496/29499)    Result Date: 9/13/2024  PROCEDURE:  CTA BRAIN + CTA CAROTIDS (CPT=70496/75400)  COMPARISON:  [facility] , CT, CT STROKE BRAIN (NO IV)(CPT=70450), 9/12/2024, 10:27 AM.  INDICATIONS:  Loss of vision  TECHNIQUE:  CT angiography of the head and neck were obtained with non-ionic contrast.  3D volume rendering images were obtained by the technologist as well as the radiologist on an independent workstation.  Multiplanar 3D reformatted imaging including multiplanar MIP images were obtained.  Curved planar reformats were performed through the carotid and vertebral arteries. All measurements obtained in this exam were performed using NASCET criteria.  Dose reduction techniques were used. Dose information is transmitted to the ACR (American College of Radiology) NRDR (National Radiology Data Registry) which includes the Dose Index Registry.  PATIENT STATED HISTORY:(As transcribed by Technologist)  No current head or neck pain.   CONTRAST USED:  75cc of Isovue 370  FINDINGS:  Mild prominence of the sulci.  There is no midline shift or mass-effect.  The basal cisterns are patent.  The gray-white matter differentiation is intact.  There is no acute intracranial hemorrhage or extra-axial fluid collection.  There is no focal parenchymal attenuation abnormality.  There is no evident fracture.  The visualized paranasal sinuses and mastoid air cells are unremarkable.  The upper cervical, petrous, cavernous, and supraclinoid internal carotid arteries are unremarkable.  An anterior  communicating artery is seen.  The branches of the anterior cerebral and middle cerebral arteries are unremarkable.  A small right posterior  communicating artery is seen along with a small left posterior communicating artery.  The branches of the posterior cerebral and superior cerebellar arteries are unremarkable.  The basilar artery has a normal course and caliber.  The bilateral vertebral  arteries are unremarkable.  The origins of the bilateral PICA are seen.  There is a 3-vessel aortic arch.  The origins of the branch vessels appear widely patent.  The bilateral subclavian arteries and innominate artery are unremarkable.  The common carotid arteries are widely patent.  The carotid bifurcations appear unremarkable.  There is no evidence of hemodynamically significant stenosis in the carotid bulbs by NASCET criteria.  The cervical internal carotid arteries are widely patent.  The vertebral arteries originate from the subclavian arteries.  The origins of the vertebral arteries are patent.  The cervical vertebral arteries are widely patent.              CONCLUSION:  Unremarkable CT angiogram head and neck exam.   LOCATION:  [facility]   Dictated by (CST): Joel Melendez MD on 2024 at 2:23 PM     Finalized by (CST): Joel Melendez MD on 2024 at 2:26 PM       CARD ECHO 2D W DOPPLER/BUBBLES (CPT=93306)    Result Date: 2024  Transthoracic Echocardiogram Name:Judith Kahn Date: 2024 :  1940 Ht:  (56in)  BP: 104 / 75 MRN:  [id number]    Age:  84years    Wt:  (155lb) HR: 66bpm Loc:  [facility]       Gndr: F          BSA: 1.59m^2 Sonographer: Joselin LÓPEZ Ordering:    Zbigniew Sewell Consulting:  Zbigniew Sewell ---------------------------------------------------------------------------- History/Indications:   Cerebrovascular accident.  Transient ischemic attack. Hyperlipidemia.  Risk factors:  Hypertension. Blood tests:    Troponin elevated.  ---------------------------------------------------------------------------- Procedure information:  A transthoracic complete 2D study was performed. Additional evaluation included M-mode, complete spectral Doppler, and color Doppler.  Patient status:  Inpatient.  Location:  Room [facility].    This was a routine study. Transthoracic echocardiography for ventricular function evaluation and assessment of valvular function. Image quality was adequate. Intravenous contrast (agitated saline) was administered to identify shunting. ECG rhythm:   Normal sinus ---------------------------------------------------------------------------- Conclusions: 1. Left ventricle: The cavity size was normal. Wall thickness was normal.    Systolic function was hyperdynamic. The estimated ejection fraction was    65-70%, by visual assessment. No diagnostic evidence for regional wall    motion abnormalities. Features are consistent with a pseudonormal left    ventricular filling pattern, with concomitant abnormal relaxation and    increased filling pressure - grade 2 diastolic dysfunction. 2. Right ventricle: The cavity size was normal. Systolic function was    normal. 3. Left atrium: The left atrial volume was mildly increased. 4. Atrial septum: Agitated saline contrast study showed no atrial level    shunt, at baseline or with provocation. 5. Aortic valve: There was mild regurgitation. 6. Mitral valve: There was mild regurgitation. 7. Pulmonary arteries: Systolic pressure was mildly increased, in the range    of 35mm Hg to 40mm Hg. Impressions:  No previous study from [facility] was available for comparison. * ---------------------------------------------------------------------------- * Findings: Left ventricle:  The cavity size was normal. Wall thickness was normal. Systolic function was hyperdynamic. The estimated ejection fraction was 65-70%, by visual assessment. No diagnostic evidence for regional wall motion  abnormalities. Features are consistent with a pseudonormal left ventricular filling pattern, with concomitant abnormal relaxation and increased filling pressure - grade 2 diastolic dysfunction. Left atrium:  The left atrial volume was mildly increased. Right ventricle:  The cavity size was normal. Systolic function was normal. Right atrium:  The atrium was normal in size. Mitral valve:  The valve was structurally normal. Leaflet separation was normal.  Doppler:  Transvalvular velocity was within the normal range. There was no evidence for stenosis. There was mild regurgitation. Aortic valve:  The valve was structurally normal. The valve was trileaflet. Cusp separation was normal.  Doppler:  Transvalvular velocity was within the normal range. There was no evidence for stenosis. There was mild regurgitation. Tricuspid valve:  The valve is structurally normal. Leaflet separation was normal.  Doppler:  Transvalvular velocity was within the normal range. There was no evidence for stenosis. There was mild regurgitation. Pulmonic valve:   The valve is structurally normal. Cusp separation was normal.  Doppler:  Transvalvular velocity was within the normal range. There was no evidence for stenosis. There was no significant regurgitation. Pericardium:   There was no pericardial effusion. Aorta: Aortic root: The aortic root was normal. Ascending aorta: The ascending aorta was normal. Pulmonary arteries: The main pulmonary artery was normal-sized. Systolic pressure was mildly increased, in the range of 35mm Hg to 40mm Hg. Systemic veins:  Central venous respirophasic diameter changes are in the normal range (>50%). Inferior vena cava: The IVC was normal-sized. Atrial septum:   Agitated saline contrast study showed no atrial level shunt, at baseline or with provocation. ---------------------------------------------------------------------------- Measurements  Left ventricle                    Value        Ref  IVS thickness, ED,  PLAX       (H) 1.1   cm     0.6 - 0.9  LV ID, ED, PLAX                   4.0   cm     3.8 - 5.2  LV ID, ES, PLAX                   2.4   cm     2.2 - 3.5  LV PW thickness, ED, PLAX     (H) 1.1   cm     0.6 - 0.9  IVS/LV PW ratio, ED, PLAX         0.98         ---------  LV PW/LV ID ratio, ED, PLAX       0.27         ---------  LV ejection fraction              71    %      54 - 74  LV e', lateral                (L) 6.1   cm/sec >=10.0  LV E/e', lateral              (H) 18           <=13  LV e', medial                 (L) 5.7   cm/sec >=7.0  LV E/e', medial                   19           ---------  LV e', average                    5.9   cm/sec ---------  LV E/e', average              (H) 18           <=14  Aortic valve                      Value        Ref  Aortic pressure half-time         453   ms     ---------  Aortic regurg deceleration        279   cm/s^2 ---------  Aortic regurg pressure            453   ms     ---------  half-time  Aortic root                       Value        Ref  Aortic root ID, ED                2.9   cm     2.4 - 3.8  Ascending aorta                   Value        Ref  Ascending aorta ID, A-P, ED       2.4   cm     1.9 - 3.5  Left atrium                       Value        Ref  LA ID, A-P, ES                    3.8   cm     2.7 - 3.8  LA volume, S                  (H) 60    ml     22 - 52  LA volume/bsa, S              (H) 38    ml/m^2 16 - 34  LA volume, ES, 1-p A4C        (H) 60    ml     22 - 52  LA volume, ES, 1-p A2C        (H) 60    ml     22 - 52  LA volume, ES, A/L                63    ml     ---------  LA volume/bsa, ES, A/L        (H) 40    ml/m^2 16 - 34  LA/aortic root ratio              1.31         ---------  Mitral valve                      Value        Ref  Mitral E-wave peak velocity       1.08  m/sec  ---------  Mitral A-wave peak velocity       0.72  m/sec  ---------  Mitral deceleration time          227   ms     ---------  Mitral peak gradient, D           5     mm  Hg  ---------  Mitral E/A ratio, peak            1.5          ---------  Pulmonary artery                  Value        Ref  PA pressure, S, DP                39    mm Hg  ---------  Tricuspid valve                   Value        Ref  Tricuspid regurg peak         (H) 2.86  m/sec  <=2.8  velocity  Tricuspid peak RV-RA gradient     36    mm Hg  ---------  Systemic veins                    Value        Ref  Estimated CVP                     3     mm Hg  ---------  Right ventricle                   Value        Ref  TAPSE, 2D                         2.10  cm     >=1.70  RV pressure, S, DP                39    mm Hg  ---------  RV s', lateral                    11.1  cm/sec >=9.5 Legend: (L)  and  (H)  cornelius values outside specified reference range. ---------------------------------------------------------------------------- Prepared and electronically signed by Arnold Leal 09/13/2024 12:25     XR CHEST AP PORTABLE  (CPT=71045)    Result Date: 9/12/2024  PROCEDURE:  XR CHEST AP PORTABLE  (CPT=71045)  TECHNIQUE:  AP chest radiograph was obtained.  COMPARISON:  [facility] , XR, XR CHEST PA + LAT CHEST (CPT=71020), 2/16/2016, 9:27 PM.  INDICATIONS:  loss of vision x 1 hour last night, severe headache, dizziness  PATIENT STATED HISTORY: (As transcribed by Technologist)  Patient offered no additional history at this time.    FINDINGS:  Shallow inspiratory film may account for cardiomediastinal prominence and pulmonary vasculature redistribution.  Linear opacities are noted within the left mid lung.  No large pleural effusion or pneumothorax.  Status post cholecystectomy with  intrathecal device tip projecting at the midthoracic level.            CONCLUSION:  1. Linear opacity within the left mid lung may represent atelectasis and or scar, early consolidative process cannot be excluded, correlate clinically.   LOCATION:  [facility]      Dictated by (CST): Vani Bingham MD on 9/12/2024 at 11:10 AM     Finalized by (CST): Vani Bingham MD on  9/12/2024 at 11:13 AM       CT STROKE BRAIN (NO IV)(CPT=70450)    Result Date: 9/12/2024            PROCEDURE:  CT STROKE BRAIN (NO IV)(CPT=70450)  COMPARISON:  None.  INDICATIONS:  loss of vision x 1 hour last night, severe headache, dizziness  TECHNIQUE:  Noncontrast CT scanning is performed through the brain.  Dose reduction techniques were used. Dose information is transmitted to the ACR (American College of Radiology) NRDR (National Radiology Data Registry) which includes the Dose Index Registry.  PATIENT STATED HISTORY: (As transcribed by Technologist)     FINDINGS: No evidence of intracranial hemorrhage or extra-axial fluid collection. Lucencies in the deep periventricular white matter are likely sequelae of chronic small vessel ischemic disease. Prominence of the sulci is noted. No mass effect. Visualized portions of paranasal sinuses are unremarkable. Visualized portions of the mastoid air cells are unremarkable. Visualized portions of the orbits are unremarkable. IMPRESSION: Sequelae of chronic small vessel ischemic disease is noted. No evidence of intracranial hemorrhage or extra-axial fluid collection.  Critical results were called to Dr. Izquierdo at 1036 hours on 9/12/2024.  There was appropriate read back.    LOCATION:  [facility]   Dictated by (CST): Joel Melendez MD on 9/12/2024 at 10:33 AM     Finalized by (CST): Joel Melendez MD on 9/12/2024 at 10:36 AM               MDM   This is an 84-year-old woman here for evaluation of an abnormal result from her Holter monitor.  Differential includes new onset atrial fibrillation, other cardiac dysrhythmia.  I discussed the case with cardiology Dr. Livingston covering for patient's cardiologist Dr. Pope.  He recommends starting anticoagulation, however first discussing with neurology whether patient is okay to start anticoagulation given her recent TIA and also whether she should continue Plavix and aspirin.  I spoke with Dr. Butterfield, he recommended  obtaining a noncontrast CT of the head to rule out any intracranial hemorrhage.  This was negative.  He is okay with starting Eliquis in this patient, however recommends no other antiplatelets, neither aspirin nor Plavix.  Patient was given the first dose of Eliquis, renally dosed at 2.5 mg twice daily, she will follow-up with her cardiologist, discussed with family at bedside, all instructions through Nep[place]e telephonic  they are in agreement with plan.                                   Medical Decision Making      Disposition and Plan     Clinical Impression:  1. New onset a-fib (HCC)         Disposition:  Discharge  9/19/2024  9:32 pm    Follow-up:  Kerry Abrams PA  [address] [facility] [address] [phone]    Follow up  Follow-up with your PMD for reevaluation in 24 to 48 hours.  Return to ER if symptoms worsen or change or if any other new concerns.    STOP TAKING BOTH ASPIRIN AND PLAVIX since your are starting eliquis.    Luis Armando Pope MD  [address] [place] [address]  4TH FLOOR OF THE [facility] [address] [phone]    Call in 1 day(s)            Medications Prescribed:  Discharge Medication List as of 9/19/2024  9:42 PM        START taking these medications    Details   apixaban 5 MG Oral Tab Take 0.5 tablets (2.5 mg total) by mouth 2 (two) times daily., Normal, Disp-30 tablet, R-0

## 2024-09-23 ENCOUNTER — TELEPHONE (OUTPATIENT)
Dept: NEUROLOGY | Facility: CLINIC | Age: 84
End: 2024-09-23

## 2024-10-16 ENCOUNTER — OFFICE VISIT (OUTPATIENT)
Dept: NEUROLOGY | Facility: CLINIC | Age: 84
End: 2024-10-16
Payer: MEDICAID

## 2024-10-16 VITALS
DIASTOLIC BLOOD PRESSURE: 70 MMHG | BODY MASS INDEX: 34.52 KG/M2 | OXYGEN SATURATION: 95 % | WEIGHT: 160 LBS | RESPIRATION RATE: 16 BRPM | HEIGHT: 57 IN | HEART RATE: 65 BPM | SYSTOLIC BLOOD PRESSURE: 120 MMHG

## 2024-10-16 DIAGNOSIS — G45.9 TIA (TRANSIENT ISCHEMIC ATTACK): Primary | ICD-10-CM

## 2024-10-16 DIAGNOSIS — I48.91 NEW ONSET A-FIB (HCC): ICD-10-CM

## 2024-10-16 PROCEDURE — 99215 OFFICE O/P EST HI 40 MIN: CPT | Performed by: OTHER

## 2024-10-16 NOTE — PATIENT INSTRUCTIONS
Follow up with Cardiology for Afib      2.  Continue Apixiban 2.5 mg twice daily for TIA/stroke prevention              Refill policies:    Allow 2-3 business days for refills; controlled substances may take longer.  Contact your pharmacy at least 5 days prior to running out of medication and have them send an electronic request or submit request through the “request refill” option in your [employer] account.  Refills are not addressed on weekends; covering physicians do not authorize routine medications on weekends.  No narcotics or controlled substances are refilled after noon on Fridays or by on call physicians.  By law, narcotics must be electronically prescribed.  A 30 day supply with no refills is the maximum allowed.  If your prescription is due for a refill, you may be due for a follow up appointment.  To best provide you care, patients receiving routine medications need to be seen at least once a year.  Patients receiving narcotic/controlled substance medications need to be seen at least once every 3 months.  In the event that your preferred pharmacy does not have the requested medication in stock (e.g. Backordered), it is your responsibility to find another pharmacy that has the requested medication available.  We will gladly send a new prescription to that pharmacy at your request.    Scheduling Tests:    If your physician has ordered radiology tests such as MRI or CT scans, please contact Central Scheduling at [phone] right away to schedule the test.  Once scheduled, the [facility] Centralized Referral Team will work with your insurance carrier to obtain pre-certification or prior authorization.  Depending on your insurance carrier, approval may take 3-10 days.  It is highly recommended patients assure they have received an authorization before having a test performed.  If test is done without insurance authorization, patient may be responsible for the entire amount billed.      Precertification and  Prior Authorizations:  If your physician has recommended that you have a procedure or additional testing performed the [facility] Centralized Referral Team will contact your insurance carrier to obtain pre-certification or prior authorization.    You are strongly encouraged to contact your insurance carrier to verify that your procedure/test has been approved and is a COVERED benefit.  Although the [facility] Centralized Referral Team does its due diligence, the insurance carrier gives the disclaimer that \"Although the procedure is authorized, this does not guarantee payment.\"    Ultimately the patient is responsible for payment.   Thank you for your understanding in this matter.  Paperwork Completion:  If you require FMLA or disability paperwork for your recovery, please make sure to either drop it off or have it faxed to our office at [phone]. Be sure the form has your name and date of birth on it.  The form will be faxed to our Forms Department and they will complete it for you.  There is a 25$ fee for all forms that need to be filled out.  Please be aware there is a 10-14 day turnaround time.  You will need to sign a release of information (DAVID) form if your paperwork does not come with one.  You may call the Forms Department with any questions at [phone].  Their fax number is [phone].

## 2024-10-16 NOTE — PROGRESS NOTES
HPI:    Patient ID: Judith Kahn is a 84 year old female.    HPI    Judith Kahn is a 84-year-old female with history of hypertension, hyperlipidemia, thyroid disorder who presented for hospital follow-up for TIA.  History obtained using  line  On September 12 patient presented to the ED with an episode of right eye vision loss that lasted for about an 1 hour and slowly improved.  There was no associated eye pain or headache at that time.  CT head and CTA head and neck performed in the ED was negative for any acute abnormality or carotid stenosis.  MRI of the brain was not done due to spinal cord stimulator.  Patient was admitted for observation and echo performed which showed an EF of 65 to 70% and left atrial volume was mildly increased.  She was discharged on 30 days cardiac event monitor, a week later had episodes of atrial fibrillation. She returned back to the ED. after discussion with cardiology she was started on Eliquis 2.5 mg twice daily.  Patient is following with Dr. Dominguez [facility]. States intermittent gets some chest pressure.   No recurrent TIA and no prior history of any TIA or stroke      HISTORY:  Past Medical History:    Back pain    Back problem    Depression    Disorder of thyroid    Essential hypertension    High blood pressure    High cholesterol    Hyperlipidemia    Incontinence    Osteoarthritis    Thyroid disease    Thyroid disease    hypothyroid      Past Surgical History:   Procedure Laterality Date    Cataract Bilateral 2015    Excis lumbar disk,one level      Eye surgery  2017    glaucoma    Hip replacement surgery      Removal gallbladder      Spine surgery procedure unlisted  2010    Spine surgery procedure unlisted  02/2020    nerve stimulator    Thyroidectomy      Total hip replacement Left 2013      Family History   Problem Relation Age of Onset    Cancer Father     Diabetes Mother     Cancer Sister       Social History      Socioeconomic History    Marital status:    Tobacco Use    Smoking status: Never    Smokeless tobacco: Never   Vaping Use    Vaping status: Never Used   Substance and Sexual Activity    Alcohol use: Never    Drug use: Never   Social History Narrative    ** Merged History Encounter **          Social Drivers of Health     Food Insecurity: No Food Insecurity (9/12/2024)    Food Insecurity     Food Insecurity: Never true   Transportation Needs: No Transportation Needs (9/12/2024)    Transportation Needs     Lack of Transportation: No   Physical Activity: High Risk (8/21/2024)    Received from [facility]    Exercise Vital Sign     On average, how many days per week do you engage in moderate to strenuous exercise (like a brisk walk)?: 0 days     On average, how many minutes do you engage in exercise at this level?: 0 min   Housing Stability: Low Risk  (9/12/2024)    Housing Stability     Housing Instability: No        Review of Systems   Constitutional: Negative.    HENT: Negative.     Eyes: Negative.  Negative for visual disturbance.   Respiratory: Negative.     Cardiovascular: Negative.    Gastrointestinal: Negative.    Endocrine: Negative.    Genitourinary: Negative.    Musculoskeletal: Negative.    Allergic/Immunologic: Negative.    Neurological: Negative.  Negative for speech difficulty and weakness.   Hematological: Negative.    Psychiatric/Behavioral: Negative.     All other systems reviewed and are negative.           Current Outpatient Medications   Medication Sig Dispense Refill    apixaban 5 MG Oral Tab Take 0.5 tablets (2.5 mg total) by mouth 2 (two) times daily. 30 tablet 0    aspirin 81 MG Oral Tab EC Take 1 tablet (81 mg total) by mouth daily. 30 tablet 2    DULoxetine 60 MG Oral Cap DR Particles Take 1 capsule (60 mg total) by mouth daily.      torsemide 10 MG Oral Tab Take 1 tablet (10 mg total) by mouth daily.      losartan 25 MG Oral Tab Take 1 tablet (25 mg total) by mouth  daily.      Metoprolol Succinate  MG Oral Tablet 24 Hr Take 0.5 tablets (50 mg total) by mouth daily.      latanoprost 0.005 % Ophthalmic Solution Place 1 drop into both eyes nightly.      atorvastatin 10 MG Oral Tab Take 2 tablets (20 mg total) by mouth daily.      HYDROcodone-acetaminophen 5-325 MG Oral Tab Take 1 tablet by mouth 3 (three) times daily as needed for Pain. Pt states she takes one a day      gabapentin (NEURONTIN) 100 MG Oral Cap Take 3 capsules (300 mg total) by mouth 3 (three) times daily.      Levothyroxine Sodium (SYNTHROID, LEVOTHROID) 75 MCG Oral Tab Take 100 mcg by mouth before breakfast.      clopidogrel 75 MG Oral Tab Take 1 tablet (75 mg total) by mouth daily. 30 tablet 2     Allergies:Allergies[1]  PHYSICAL EXAM:   Physical Exam    Blood pressure 120/70, pulse 65, resp. rate 16, height 57\", weight 160 lb (72.6 kg), SpO2 95%.  General Appearance: Well nourished, well developed, no apparent distress.     HEENT: Normocephalic and atraumatic. Normal sclera.   Cardiovascular: Normal rate, regular rhythm and normal heart sounds.    Pulmonary/Chest: Effort normal and breath sounds normal.   Abdominal: Soft. Bowel sounds are normal.   Skin: dry, clean and intact  Ext: peripheral pulses present  Psych: normal mood and affect    Neurological:  Patient is awake, alert and oriented to person, place and time   Normal memory, attention/concentration, speech and language.    Cranial Nerves:   II: Visual acuity: at baseline  II: Visual fields: normal  III: Pupils: equal, round, reactive to light  III,IV,VI: Extra Ocular Movements: intact  V: Facial sensation: intact  VII: Facial strength: intact  VIII: Hearing: intact  IX: Palate: intact  XI: Shoulder shrug: intact  XII: Tongue movement: normal    Motor: Normal tone. Strength is  5 out of 5 in all extremities bilaterally.    Sensory: Sensory examination is normal to light touch and pinprick     Coordination: Finger-to-nose normal bilaterally without  evidence of dysmetria.    Gait: normal casual gait          TESTS/IMAGING:     CT head/CTA head neck:  9/13/2024    FINDINGS:    Mild prominence of the sulci.  There is no midline shift or mass-effect.  The basal cisterns are patent.  The gray-white matter differentiation is intact.     There is no acute intracranial hemorrhage or extra-axial fluid collection.  There is no focal parenchymal attenuation abnormality.     There is no evident fracture.  The visualized paranasal sinuses and mastoid air cells are unremarkable.     The upper cervical, petrous, cavernous, and supraclinoid internal carotid arteries are unremarkable.  An anterior communicating artery is seen.  The branches of the anterior cerebral and middle cerebral arteries are unremarkable.  A small right posterior   communicating artery is seen along with a small left posterior communicating artery.  The branches of the posterior cerebral and superior cerebellar arteries are unremarkable.  The basilar artery has a normal course and caliber.  The bilateral vertebral   arteries are unremarkable.  The origins of the bilateral PICA are seen.     There is a 3-vessel aortic arch.  The origins of the branch vessels appear widely patent.  The bilateral subclavian arteries and innominate artery are unremarkable.     The common carotid arteries are widely patent.  The carotid bifurcations appear unremarkable.  There is no evidence of hemodynamically significant stenosis in the carotid bulbs by NASCET criteria.  The cervical internal carotid arteries are widely  patent.     The vertebral arteries originate from the subclavian arteries.  The origins of the vertebral arteries are patent.  The cervical vertebral arteries are widely patent.                        Impression   CONCLUSION:  Unremarkable CT angiogram head and neck exam.                     ASSESSMENT/PLAN:     Encounter Diagnoses   Name Primary?    TIA (transient ischemic attack) Yes    New onset a-fib  (HCC)      Patient is a 84-year-old female who presented for follow-up for right amaurosis fugax likely embolic given new onset Afib  CT head negative.  CTA head and neck was negative for any carotid stenosis or occlusion  No MRI done due to spinal cord stimulator    Continue Eliquis 2.5 mg twice daily  Follow-up with cardiology for A-fib management    Counseled on the signs and symptoms of TIA or stroke  I will see her on as-needed basis    No orders of the defined types were placed in this encounter.      Thank you for allowing us to participate in your patient's care.    Ryann Rainey MD  [facility] neurosciences [place]          Meds This Visit:  Requested Prescriptions      No prescriptions requested or ordered in this encounter       Imaging & Referrals:  None     ID#[id number]         [1] No Known Allergies

## 2025-06-14 NOTE — ED INITIAL ASSESSMENT (HPI)
Patient was chasing her dog and tripped and hit her head on the sidewalk. Patient is on blood thinners. Patient denies LOC

## 2025-06-14 NOTE — ED PROVIDER NOTES
Patient Seen in: [facility] Emergency Department        History  Chief Complaint   Patient presents with    Fall     Stated Complaint:     Subjective:   HPI            This is a 85-year-old female who was chasing her dog and tripped and fell and hit her head on the sidewalk.  The patient states that she did not pass out.  The patient does complain now is starting to have she did not have any neck pain originally which started to have some neck pain on the right side of her neck.  She also has pain over the right orbit she denies any vision changes.  This is a mechanical fall she was not dizzy or lightheaded prior to the fall.  She denies any upper extremity pain lower extremity pain numbness weakness chest pain or abdominal pain.  Has no other specific complaints at this present time.  She is here with her daughter.  Information is obtained through . .      Objective:     Past Medical History:    Essential hypertension              History reviewed. No pertinent surgical history.             Social History     Socioeconomic History    Marital status:    Tobacco Use    Smoking status: Never    Smokeless tobacco: Never   Substance and Sexual Activity    Alcohol use: Never    Drug use: Never                                Physical Exam    ED Triage Vitals [06/14/25 1715]   /60   Pulse 83   Resp 14   Temp 98.6 °F (37 °C)   Temp src Temporal   SpO2 99 %   O2 Device None (Room air)       Current Vitals:   Vital Signs  BP: 133/75  Pulse: 83  Resp: 14  Temp: 98.2 °F (36.8 °C)  Temp src: Tympanic  MAP (mmHg): 79    Oxygen Therapy  SpO2: 100 %  O2 Device: None (Room air)            Physical Exam     General: .  There is no signs of trauma on her lower extremities.  She does have a large hematoma over the right orbit.  With some abrasion.  There is no lacerations for me to sew up.  Her extraocular muscles are intact grossly intact.  Pupils equal reactive.  The patient is in no respiratory  distress    HEENT: Atraumatic, conjunctiva are not pale.  There is no icterus.  Oral mucosa Is wet.  No facial trauma.  The neck is supple.  There is no midline neck tenderness.  There is normal range of motion of the neck.  There is some mild right paraspinal neck tenderness.    LUNGS: Clear to auscultation, there is no wheezing or retraction.  No crackles.    CV: Cardiovascular is regular without murmurs or rubs.    ABD: The abdomen is soft nondistended nontender.  There is no rebound.  There is no guarding.    EXT: There is good pulses bilaterally.  There is no calf tenderness.  There is no rash noted.  There is no edema  No upper extremities or lower extremity tenderness is noted.  Patient has no other focal findings.  NEURO: Alert and oriented x4.  Muscle strength and sensory exam is grossly normal.  And the patient is neurologically intact with no focal findings.        ED Course  Labs Reviewed - No data to display     Workup was done to rule out intracranial bleed, cervical spine fractures, intracranial bleed was considered orbital fractures were considered.  No other facial tenderness is noted.        I personally reviewed the radiographs and my individual interpretation shows    No obvious intracranial bleed large hematoma right external orbit.    Also reviewed official report and it shows   CT SPINE CERVICAL (CPT=72125)  Result Date: 6/14/2025            PROCEDURE:  CT SPINE CERVICAL (CPT=72125)  COMPARISON:  None.  INDICATIONS:  pain  TECHNIQUE:  Noncontrast CT scanning of the cervical spine is performed from the skull base through C7.  Multiplanar reconstructions are generated.  Dose reduction techniques were used. Dose information is transmitted to the ACR (American College of Radiology) NRDR (National Radiology Data Registry) which includes the Dose Index Registry.  PATIENT STATED HISTORY: (As transcribed by Technologist)  Patient had a fall and presents with right sided neck pain and swelling.      FINDINGS: Reversal the normal cervical lordosis. Vertebral body heights are maintained throughout the cervical spine. Moderate to marked loss of C4-5, C5-6 and mild loss of C6-7 disc spaces with endplate osteoarthritic changes. No evidence of fracture or dislocation. Prevertebral soft tissues are within normal limits. Predental space is normal. C2-C3:  Minimal central disc bulge without spinal canal or neural foraminal stenosis. C3-C4:  Central disc bulge without spinal canal or neural foraminal stenosis. C4-C5:  Left paracentral uncal osteophyte with moderate left neural foraminal stenosis without spinal canal stenosis. C5-C6:  Posterior osteophyte without spinal canal or neural foraminal stenosis. C6-C7:  Posterior osteophyte without spinal canal or neural foraminal stenosis. C7-T1: No disc herniation or spinal canal or neuroforaminal stenosis.  IMPRESSION: No fracture or dislocation.  Moderate osteoarthritic changes in the cervical spine.    LOCATION:  [facility]   Dictated by (CST): Joel Melendez MD on 6/14/2025 at 6:55 PM     Finalized by (CST): Joel Melendez MD on 6/14/2025 at 6:57 PM       CT ORBITS (CPT=70480)  Result Date: 6/14/2025  PROCEDURE:  CT ORBITS (CPT=70480)  COMPARISON:  [facility] , CT, CT BRAIN OR HEAD (37836), 6/14/2025, 5:56 PM.  INDICATIONS:  pain  TECHNIQUE:  Multi-planar CT images were performed through the orbits without intravenous contrast.  3D shaded surface renderings and MPR's are generated on an independent CT scanner workstation.  Dose reduction techniques were used. Dose information is transmitted to the ACR (American College of Radiology) NRDR (National Radiology Data Registry) which includes the Dose Index Registry.  3-D RENDERING:   PATIENT STATED HISTORY:(As transcribed by Technologist)  Patient had a fall and presents with right sided eye pain and swelling.    FINDINGS:  GLOBES:  No asymmetry or no visible mass.  EXTRAOCULAR MUSCLES:  No enlargement or asymmetry.  INTRACONAL  SPACE:  No visible mass, with normal retrobulbar fat.  EXTRACONAL SPACE:  No visible mass.  SINUSES:  No significant mucosal thickening or fluid.  BONES:  Intact bony orbit, without evidence of fracture.  OTHER:  Subcutaneous hematoma over the right frontal bone and orbit is noted.            CONCLUSION:  Subcutaneous hematoma over the right orbit and right frontal bone.  Bilateral globes are intact.  Bilateral optic nerves and extraocular muscles are symmetric.  Retrobulbar fat is well preserved.   LOCATION:  [facility]   Dictated by (CST): Joel Melendez MD on 6/14/2025 at 6:53 PM     Finalized by (CST): Joel Melendez MD on 6/14/2025 at 6:55 PM       CT BRAIN OR HEAD (CPT=70450)  Result Date: 6/14/2025            PROCEDURE:  CT BRAIN OR HEAD (11494)  COMPARISON:  [facility] , CT, BRAIN W/O CONTRAST, 8/10/2008, 6:27 PM.  INDICATIONS:  pain  TECHNIQUE:  Noncontrast CT scanning is performed through the brain. Dose reduction techniques were used. Dose information is transmitted to the ACR (American College of Radiology) NRDR (National Radiology Data Registry) which includes the Dose Index Registry.  PATIENT STATED HISTORY: (As transcribed by Technologist)  Patient had a fall and presents with right sided pain and swelling.    FINDINGS: No evidence of intracranial hemorrhage or extra-axial fluid collection. Lucencies in the deep periventricular white matter are likely sequelae of chronic small vessel ischemic disease. Mild prominence of the sulci.  Subcutaneous hematoma over the right orbit and right frontal bone. No mass effect. Visualized portions of paranasal sinuses are unremarkable. Visualized portions of the mastoid air cells are unremarkable. Visualized portions of the orbits are unremarkable. IMPRESSION: Subcutaneous hematoma over the right frontal bone and right orbit is noted.  Sequelae of chronic small vessel ischemic disease is noted. No evidence of intracranial hemorrhage or extra-axial fluid collection.     LOCATION:  [place]   Dictated by (CST): Joel Melendez MD on 6/14/2025 at 6:51 PM     Finalized by (CST): Joel Melendez MD on 6/14/2025 at 6:53 PM            I did go back and reexamined her she is neurologically intact she does take Eliquis.  Discussed may be to hold Eliquis that she takes it for her heart.  No history of recent pulmonary embolism just for 1 day I would recommend just holding it.  I discussed with the family that if she has any severe headache, nausea, vomiting, confusion she can she is to return here.  The family was comfortable with this they all this was explained through a  I reexamined her she is alert and oriented no focal findings.  They feel very comfortable going home.  The patient was discharged home with close follow-up    I discussed with the patient that were seeing them  in a short period of time.  I discussed with them that there is always a possibility that things can change and a need reevaluation with their primary care physician as soon as possible.  I've also discussed with them that if the pain gets worse to return to the emergency room immediately.  MDM    Medical Decision Making      Disposition and Plan     Clinical Impression:  1. Injury of head, initial encounter    2. Orbital contusion, right, initial encounter         Disposition:  Discharge  6/14/2025  7:32 pm    Follow-up:  Kerry Abrams PA  [id number] [facility] [id number] [phone]    Follow up in 2 day(s)      Johnnie Lee MD  [address] [place] [address] [id number] [phone]    Follow up in 2 day(s)            Medications Prescribed:  There are no discharge medications for this patient.            Supplementary Documentation:

## 2025-06-15 NOTE — DISCHARGE INSTRUCTIONS
Follow-up with your primary care physician return if any severe headache, dizziness, nausea, vomiting, numbness, weakness.    You were seen in the emergency room in a limited time.  There is a possibility that although we do not see any acute process at this present time that things can change with time.  Is therefore imperative that you follow-up with primary care physician for close follow-up.  If there is any significant progression of your pain  or other symptoms you to return immediately to the emergency room.